# Patient Record
Sex: MALE | Race: WHITE | NOT HISPANIC OR LATINO | Employment: FULL TIME | URBAN - METROPOLITAN AREA
[De-identification: names, ages, dates, MRNs, and addresses within clinical notes are randomized per-mention and may not be internally consistent; named-entity substitution may affect disease eponyms.]

---

## 2017-01-10 ENCOUNTER — OFFICE VISIT (OUTPATIENT)
Dept: SURGERY | Facility: CLINIC | Age: 52
End: 2017-01-10

## 2017-01-10 ENCOUNTER — HOSPITAL ENCOUNTER (OUTPATIENT)
Dept: GENERAL RADIOLOGY | Facility: HOSPITAL | Age: 52
Discharge: HOME | End: 2017-01-10
Attending: THORACIC SURGERY (CARDIOTHORACIC VASCULAR SURGERY) | Admitting: THORACIC SURGERY (CARDIOTHORACIC VASCULAR SURGERY)

## 2017-01-10 VITALS
WEIGHT: 185 LBS | OXYGEN SATURATION: 96 % | RESPIRATION RATE: 16 BRPM | HEIGHT: 75 IN | HEART RATE: 88 BPM | BODY MASS INDEX: 23 KG/M2 | SYSTOLIC BLOOD PRESSURE: 120 MMHG | DIASTOLIC BLOOD PRESSURE: 74 MMHG

## 2017-01-10 DIAGNOSIS — S22.41XD CLOSED FRACTURE OF MULTIPLE RIBS OF RIGHT SIDE WITH ROUTINE HEALING, SUBSEQUENT ENCOUNTER: ICD-10-CM

## 2017-01-10 DIAGNOSIS — S22.41XD CLOSED FRACTURE OF MULTIPLE RIBS OF RIGHT SIDE WITH ROUTINE HEALING, SUBSEQUENT ENCOUNTER: Primary | ICD-10-CM

## 2017-01-10 DIAGNOSIS — Z09 FOLLOW-UP EXAMINATION FOLLOWING SURGERY: ICD-10-CM

## 2017-01-10 PROCEDURE — 71020 HC CHEST PA AND LATERAL: CPT

## 2017-01-10 PROCEDURE — 99024 POSTOP FOLLOW-UP VISIT: CPT | Performed by: THORACIC SURGERY (CARDIOTHORACIC VASCULAR SURGERY)

## 2017-01-10 NOTE — MR AVS SNAPSHOT
Blayne Grijalva   1/10/2017 1:45 PM   Office Visit    Dept Phone:  [phone]   Encounter #:  [id number]    Provider:  Vance Aguayo III, MD   Department:  [facility] THORACIC SURGERY                Your Full Care Plan              Your Updated Medication List          This list is accurate as of: 1/10/17  2:02 PM.  Always use your most recent med list.                azithromycin 250 MG tablet   Commonly known as:  ZITHROMAX Z-ALEJANDRO   Take 2 tablets the first day, then 1 tablet daily for 4 days.       fluticasone 50 MCG/ACT nasal spray   Commonly known as:  FLONASE       promethazine-dextromethorphan 6.25-15 MG/5ML syrup   Commonly known as:  PROMETHAZINE-DM   Take 5 mL by mouth 4 (Four) Times a Day As Needed for cough.       Pseudoephedrine-Guaifenesin -1200 MG tablet sustained-release 12 hour       * warfarin 10 MG tablet   Commonly known as:  COUMADIN       * warfarin 7.5 MG tablet   Commonly known as:  COUMADIN       * Notice:  This list has 2 medication(s) that are the same as other medications prescribed for you. Read the directions carefully, and ask your doctor or other care provider to review them with you.            You Were Diagnosed With        Codes Comments    Closed fracture of multiple ribs of right side with routine healing, subsequent encounter    -  Primary ICD-10-CM: S22.41XD  ICD-9-CM: V54.19     Follow-up examination following surgery     ICD-10-CM: Z09  ICD-9-CM: V67.00       Instructions     None    Patient Instructions History      Upcoming Appointments     Visit Type Date Time Department    OFFICE VISIT 1/10/2017  1:45 PM [facility] THORACIC SPEC SEUN    XR SEUN CHEST 2 VW 1/10/2017  2:45 PM [facility] SEUN XRAY      MyChart Signup     Our records indicate that you have an active [employer] account.    You can view your After Visit Summary by going to [employer] and logging in with your [employer] username and password.  If you  "don't have a [employer] username and password but a parent or guardian has access to your record, the parent or guardian should login with their own [employer] username and password and access your record to view the After Visit Summary.    If you have questions, you can email [email or web address] or call [phone] to talk to our [employer] staff.  Remember, [employer] is NOT to be used for urgent needs.  For medical emergencies, dial 911.               Other Info from Your Visit           Your Appointments     Anthony 10, 2017  2:45 PM EST   XR seun chest 2 vw with SEUN XR 8   [facility] XRAY ([place])    [address] [facility] [address] [phone]           Bring along any outside films relating to this procedure. Arrive 15 minutes before your scheduled time.              Allergies     No Known Allergies      Reason for Visit     Follow-up 2 MO F/U VISIT W/ CXR      Vital Signs     Blood Pressure Pulse Respirations Height Weight Oxygen Saturation    120/74 (BP Location: Left arm, Patient Position: Sitting, Cuff Size: Adult) 88 16 75\" (190.5 cm) 185 lb (83.9 kg) 96%    Body Mass Index Smoking Status                23.12 kg/m2 Never Smoker          Problems and Diagnoses Noted     Closed fracture of multiple ribs of right side with routine healing, subsequent encounter    -  Primary    Encounter for examination following surgery            "

## 2017-01-10 NOTE — PROGRESS NOTES
Subjective   Patient ID: Blayne Grijalva is a 51 y.o. male who presents to the office today for a 2 month follow up visit with chest xray.    History of Present Illness    Blayne Grijalva was seen in our office today anyway 10/20/17 for further follow-up of a right thoracotomy with evacuation of hemothorax and open reduction internal fixation of the broken ribs performed September 23, 2016.  Since his last visit here he has done very well.  He has resumed all of his normal activities.  He runs on regular basis, rides his bike, and works out at a gym.  With all these exercises he has had no chest wall discomfort.  He has had no shortness of breath.  He has had no cough or hemoptysis.  By his account he has returned to his normal state.    The following portions of the patient's history were reviewed and updated as appropriate: allergies, current medications, past family history, past medical history, past social history, past surgical history and problem list.  Review of Systems   Constitution: Negative.   HENT: Negative.    Eyes: Negative.    Cardiovascular: Negative.    Respiratory: Negative.    Endocrine: Negative.    Hematologic/Lymphatic: Negative.    Skin: Negative.    Musculoskeletal: Negative.    Gastrointestinal: Negative.    Genitourinary: Negative.    Neurological: Negative.    Psychiatric/Behavioral: Negative.      Patient Active Problem List   Diagnosis   • Hypercoagulable state   • Factor V Leiden mutation   • Anticoagulant long-term use   • Anemia associated with acute blood loss   • Closed fracture of multiple ribs of right side   • Hemothorax with pneumothorax, traumatic   • Lung laceration   • Atelectasis   • Factor V deficiency   • Low back pain   • Rash     Past Medical History   Diagnosis Date   • DVT (deep venous thrombosis)    • Factor V Leiden    • Factor V Leiden    • Hypercoagulable state      Past Surgical History   Procedure Laterality Date   • Thoracoscopy Right 9/23/2016     Procedure:  RIGHT VIDEO ASSISTED THORACOSCOPY  WITH OPEN REDUCTION AND INTERNAL FIXATION OF MULTIPLE RIB FRACTURES, PARTIAL DECORTICATION OF RIGHT LOWER LOBE.;  Surgeon: Brijesh Arguello MD;  Location: [facility] MAIN OR;  Service:    • Colonoscopy N/A 10/19/2016     Procedure: COLONOSCOPY TO CECUM WITH HOT SNARE POLYPECTOMY AND CLIP PLACEMENT X 1;  Surgeon: Gilson Foy MD;  Location: [facility] ENDOSCOPY;  Service:      Family History   Problem Relation Age of Onset   • Ovarian cancer Mother    • Bleeding Disorder Mother    • Bleeding Disorder Father    • Bleeding Disorder Sister    • No Known Problems Brother    • No Known Problems Daughter    • No Known Problems Son    • No Known Problems Maternal Aunt    • No Known Problems Maternal Uncle    • No Known Problems Paternal Aunt    • No Known Problems Paternal Uncle    • No Known Problems Maternal Grandmother    • No Known Problems Maternal Grandfather    • No Known Problems Paternal Grandmother    • No Known Problems Paternal Grandfather    • Bleeding Disorder Sister      Social History     Social History   • Marital status:      Spouse name: N/A   • Number of children: N/A   • Years of education: N/A     Occupational History   • Not on file.     Social History Main Topics   • Smoking status: Never Smoker   • Smokeless tobacco: Never Used   • Alcohol use 4.2 oz/week     7 Cans of beer per week   • Drug use: No   • Sexual activity: Defer     Other Topics Concern   • Not on file     Social History Narrative       Current Outpatient Prescriptions:   •  azithromycin (ZITHROMAX Z-ALEJANDRO) 250 MG tablet, Take 2 tablets the first day, then 1 tablet daily for 4 days., Disp: 6 tablet, Rfl: 0  •  fluticasone (FLONASE) 50 MCG/ACT nasal spray, INHALE ONE PUFF IN EACH NOSTRIL IN THE MORNING AND EVENING, Disp: , Rfl: 2  •  promethazine-dextromethorphan (PROMETHAZINE-DM) 6.25-15 MG/5ML syrup, Take 5 mL by mouth 4 (Four) Times a Day As Needed for cough., Disp: 90 mL, Rfl: 0  •   Pseudoephedrine-Guaifenesin -1200 MG tablet sustained-release 12 hour, Take  by mouth., Disp: , Rfl:   •  warfarin (COUMADIN) 10 MG tablet, Take 10 mg by mouth daily., Disp: , Rfl:   •  warfarin (COUMADIN) 7.5 MG tablet, Take 7.5 mg by mouth daily., Disp: , Rfl:   No Known Allergies     Objective   Vitals:    01/10/17 1351   BP: 120/74   Pulse: 88   Resp: 16   SpO2: 96%     Physical Exam   Constitutional: He is oriented to person, place, and time. Vital signs are normal. He appears well-developed.   HENT:   Head: Normocephalic and atraumatic.   Neck: Normal range of motion. Neck supple.   Cardiovascular: Normal rate, regular rhythm, normal heart sounds and intact distal pulses.    No murmur heard.  Pulmonary/Chest: Effort normal and breath sounds normal. He has no wheezes. He has no rhonchi. He has no rales. He exhibits no tenderness.   Abdominal: Soft. There is no tenderness.   Musculoskeletal: Normal range of motion. He exhibits no edema or tenderness.   Neurological: He is alert and oriented to person, place, and time. He has normal strength.   Skin: Skin is warm and dry. No rash noted. No cyanosis or erythema.   Psychiatric: He has a normal mood and affect. His behavior is normal.       Assessment/Plan   Independent Review of Radiographic Studies:    Chest x-ray performed today was independently reviewed and compared to previous x-rays.  There is some blunting of the right costophrenic angle which is unchanged.  Both lungs are fully expanded.  There is no infiltrates and no effusions.  The rib plates are in good position.    Discussion: Mr. Grijalva has made a good recovery from his injury and surgery.  He has resumed his normal activities.  I will release him from our care but will be glad to see him anytime in the future you feel it's necessary.  Thank you for allowing us to participate in the care of Mr. Grijalva.      Diagnoses and all orders for this visit:    Closed fracture of multiple ribs of right  side with routine healing, subsequent encounter    Follow-up examination following surgery

## 2017-01-10 NOTE — LETTER
January 10, 2017     Mariano Sanchez MD  [address]  [facility] [address]    Patient: Blayne Grijalva   YOB: 1965   Date of Visit: 1/10/2017       Dear Dr. Daniel MD:    Thank you for referring Blayne Grijalva to me for evaluation. Below are the relevant portions of my assessment and plan of care.    If you have questions, please do not hesitate to call me. I look forward to following Blayne along with you.         Sincerely,        Vance Aguayo III, MD        CC: MD Vance Gleason III, MD  1/10/2017  2:06 PM  Sign at close encounter  Subjective   Patient ID: Blayne Grijalva is a 51 y.o. male who presents to the office today for a 2 month follow up visit with chest xray.    History of Present Illness    Blayne Grijalva was seen in our office today anyway 10/20/17 for further follow-up of a right thoracotomy with evacuation of hemothorax and open reduction internal fixation of the broken ribs performed September 23, 2016.  Since his last visit here he has done very well.  He has resumed all of his normal activities.  He runs on regular basis, rides his bike, and works out at a gym.  With all these exercises he has had no chest wall discomfort.  He has had no shortness of breath.  He has had no cough or hemoptysis.  By his account he has returned to his normal state.    The following portions of the patient's history were reviewed and updated as appropriate: allergies, current medications, past family history, past medical history, past social history, past surgical history and problem list.  Review of Systems   Constitution: Negative.   HENT: Negative.    Eyes: Negative.    Cardiovascular: Negative.    Respiratory: Negative.    Endocrine: Negative.    Hematologic/Lymphatic: Negative.    Skin: Negative.    Musculoskeletal: Negative.    Gastrointestinal: Negative.    Genitourinary: Negative.    Neurological: Negative.    Psychiatric/Behavioral: Negative.      Patient  Active Problem List   Diagnosis   • Hypercoagulable state   • Factor V Leiden mutation   • Anticoagulant long-term use   • Anemia associated with acute blood loss   • Closed fracture of multiple ribs of right side   • Hemothorax with pneumothorax, traumatic   • Lung laceration   • Atelectasis   • Factor V deficiency   • Low back pain   • Rash     Past Medical History   Diagnosis Date   • DVT (deep venous thrombosis)    • Factor V Leiden    • Factor V Leiden    • Hypercoagulable state      Past Surgical History   Procedure Laterality Date   • Thoracoscopy Right 9/23/2016     Procedure: RIGHT VIDEO ASSISTED THORACOSCOPY  WITH OPEN REDUCTION AND INTERNAL FIXATION OF MULTIPLE RIB FRACTURES, PARTIAL DECORTICATION OF RIGHT LOWER LOBE.;  Surgeon: Brijesh Arguello MD;  Location: [facility] OR;  Service:    • Colonoscopy N/A 10/19/2016     Procedure: COLONOSCOPY TO CECUM WITH HOT SNARE POLYPECTOMY AND CLIP PLACEMENT X 1;  Surgeon: Gilson Foy MD;  Location: [facility] ENDOSCOPY;  Service:      Family History   Problem Relation Age of Onset   • Ovarian cancer Mother    • Bleeding Disorder Mother    • Bleeding Disorder Father    • Bleeding Disorder Sister    • No Known Problems Brother    • No Known Problems Daughter    • No Known Problems Son    • No Known Problems Maternal Aunt    • No Known Problems Maternal Uncle    • No Known Problems Paternal Aunt    • No Known Problems Paternal Uncle    • No Known Problems Maternal Grandmother    • No Known Problems Maternal Grandfather    • No Known Problems Paternal Grandmother    • No Known Problems Paternal Grandfather    • Bleeding Disorder Sister      Social History     Social History   • Marital status:      Spouse name: N/A   • Number of children: N/A   • Years of education: N/A     Occupational History   • Not on file.     Social History Main Topics   • Smoking status: Never Smoker   • Smokeless tobacco: Never Used   • Alcohol use 4.2 oz/week     7 Cans of beer per  week   • Drug use: No   • Sexual activity: Defer     Other Topics Concern   • Not on file     Social History Narrative       Current Outpatient Prescriptions:   •  azithromycin (ZITHROMAX Z-ALEJANDRO) 250 MG tablet, Take 2 tablets the first day, then 1 tablet daily for 4 days., Disp: 6 tablet, Rfl: 0  •  fluticasone (FLONASE) 50 MCG/ACT nasal spray, INHALE ONE PUFF IN EACH NOSTRIL IN THE MORNING AND EVENING, Disp: , Rfl: 2  •  promethazine-dextromethorphan (PROMETHAZINE-DM) 6.25-15 MG/5ML syrup, Take 5 mL by mouth 4 (Four) Times a Day As Needed for cough., Disp: 90 mL, Rfl: 0  •  Pseudoephedrine-Guaifenesin -1200 MG tablet sustained-release 12 hour, Take  by mouth., Disp: , Rfl:   •  warfarin (COUMADIN) 10 MG tablet, Take 10 mg by mouth daily., Disp: , Rfl:   •  warfarin (COUMADIN) 7.5 MG tablet, Take 7.5 mg by mouth daily., Disp: , Rfl:   No Known Allergies     Objective   Vitals:    01/10/17 1351   BP: 120/74   Pulse: 88   Resp: 16   SpO2: 96%     Physical Exam   Constitutional: He is oriented to person, place, and time. Vital signs are normal. He appears well-developed.   HENT:   Head: Normocephalic and atraumatic.   Neck: Normal range of motion. Neck supple.   Cardiovascular: Normal rate, regular rhythm, normal heart sounds and intact distal pulses.    No murmur heard.  Pulmonary/Chest: Effort normal and breath sounds normal. He has no wheezes. He has no rhonchi. He has no rales. He exhibits no tenderness.   Abdominal: Soft. There is no tenderness.   Musculoskeletal: Normal range of motion. He exhibits no edema or tenderness.   Neurological: He is alert and oriented to person, place, and time. He has normal strength.   Skin: Skin is warm and dry. No rash noted. No cyanosis or erythema.   Psychiatric: He has a normal mood and affect. His behavior is normal.       Assessment/Plan   Independent Review of Radiographic Studies:    Chest x-ray performed today was independently reviewed and compared to previous x-rays.   There is some blunting of the right costophrenic angle which is unchanged.  Both lungs are fully expanded.  There is no infiltrates and no effusions.  The rib plates are in good position.    Discussion: Mr. Grijalva has made a good recovery from his injury and surgery.  He has resumed his normal activities.  I will release him from our care but will be glad to see him anytime in the future you feel it's necessary.  Thank you for allowing us to participate in the care of Mr. Grijalva.      Diagnoses and all orders for this visit:    Closed fracture of multiple ribs of right side with routine healing, subsequent encounter    Follow-up examination following surgery

## 2017-01-12 ENCOUNTER — TELEPHONE (OUTPATIENT)
Dept: ONCOLOGY | Facility: HOSPITAL | Age: 52
End: 2017-01-12

## 2017-01-12 LAB
EXTERNAL INR: 2.1
EXTERNAL RESULTING LABORATORY: NORMAL

## 2017-01-12 NOTE — TELEPHONE ENCOUNTER
RECEIVED INR FROM [facility]. INR 2.1 TODAY. PT'S RANGE IS 2.5-3.5. PT STATES HE JUST GOT DONE TAKING A Z-ALEJANDRO A FEW DAYS AGO. HE DENIES ANY MISSED DOSES. PLACED IN SS AND REVIEWED WITH TREY CERVANTES NP. PER TREY, HAVE PT TAKE 20MG SUN, TUES, THURS AND 17.5MG ALL OTHER DAYS. HE SHOULD RECHECK NEXT WEEK. INFORMED PT OF THIS AND HE V/U.

## 2017-01-13 LAB
EXTERNAL INR: 2.1
EXTERNAL RESULTING LABORATORY: NORMAL

## 2017-01-17 ENCOUNTER — OFFICE VISIT (OUTPATIENT)
Dept: FAMILY MEDICINE CLINIC | Facility: CLINIC | Age: 52
End: 2017-01-17

## 2017-01-17 VITALS
DIASTOLIC BLOOD PRESSURE: 68 MMHG | RESPIRATION RATE: 18 BRPM | BODY MASS INDEX: 24.21 KG/M2 | HEART RATE: 68 BPM | OXYGEN SATURATION: 98 % | HEIGHT: 75 IN | SYSTOLIC BLOOD PRESSURE: 110 MMHG | WEIGHT: 194.7 LBS

## 2017-01-17 DIAGNOSIS — J41.0 SIMPLE CHRONIC BRONCHITIS (HCC): Primary | ICD-10-CM

## 2017-01-17 PROCEDURE — 99213 OFFICE O/P EST LOW 20 MIN: CPT | Performed by: NURSE PRACTITIONER

## 2017-01-17 RX ORDER — GUAIFENESIN AND PSEUDOEPHEDRINE HCL 1200; 120 MG/1; MG/1
TABLET, EXTENDED RELEASE ORAL
COMMUNITY
End: 2018-04-05

## 2017-01-17 RX ORDER — DEXTROMETHORPHAN HYDROBROMIDE AND PROMETHAZINE HYDROCHLORIDE 15; 6.25 MG/5ML; MG/5ML
5 SYRUP ORAL 4 TIMES DAILY PRN
Qty: 90 ML | Refills: 0 | Status: SHIPPED | OUTPATIENT
Start: 2017-01-17 | End: 2018-04-05

## 2017-01-17 RX ORDER — AMOXICILLIN AND CLAVULANATE POTASSIUM 875; 125 MG/1; MG/1
1 TABLET, FILM COATED ORAL 2 TIMES DAILY
Qty: 20 TABLET | Refills: 0 | Status: SHIPPED | OUTPATIENT
Start: 2017-01-17 | End: 2018-04-05

## 2017-01-17 RX ORDER — WARFARIN SODIUM 5 MG/1
17.5 TABLET ORAL
COMMUNITY
End: 2017-04-25 | Stop reason: SDUPTHER

## 2017-01-17 NOTE — MR AVS SNAPSHOT
Blayne Grijalva   1/17/2017 2:00 PM   Office Visit    Provider:  JENI Adorno   Department:  [facility] PRIMARY CARE   Dept Phone:  [phone]                Your Full Care Plan              Today's Medication Changes          These changes are accurate as of: 1/17/17  3:47 PM.  If you have any questions, ask your nurse or doctor.               New Medication(s)Ordered:     amoxicillin-clavulanate 875-125 MG per tablet   Commonly known as:  AUGMENTIN   Take 1 tablet by mouth 2 (Two) Times a Day.   Started by:  JENI Adorno         Medication(s)that have changed:     Pseudoephedrine-Guaifenesin -1200 MG tablet sustained-release 12 hour   Take  by mouth.   What changed:  Another medication with the same name was removed. Continue taking this medication, and follow the directions you see here.   Changed by:  JENI Adorno       warfarin 5 MG tablet   Commonly known as:  COUMADIN   Take 17.5 mg by mouth Daily.   What changed:  Another medication with the same name was removed. Continue taking this medication, and follow the directions you see here.   Changed by:  JENI Adorno         Stop taking medication(s)listed here:     azithromycin 250 MG tablet   Commonly known as:  ZITHROMAX Z-ALEJANDRO   Stopped by:  JENI Adorno                Where to Get Your Medications      These medications were sent to [facility]/pharmacy #[id number] - [place], [place] - [address] IN [facility] [phone] [facility] [phone]   [address], [place] [address]    Hours:  24-hours Phone:  [phone]     amoxicillin-clavulanate 875-125 MG per tablet    promethazine-dextromethorphan 6.25-15 MG/5ML syrup                  Your Updated Medication List          This list is accurate as of: 1/17/17  3:47 PM.  Always use your most recent med list.                amoxicillin-clavulanate 875-125 MG per tablet   Commonly known as:  AUGMENTIN   Take 1  "tablet by mouth 2 (Two) Times a Day.       fluticasone 50 MCG/ACT nasal spray   Commonly known as:  FLONASE       promethazine-dextromethorphan 6.25-15 MG/5ML syrup   Commonly known as:  PROMETHAZINE-DM   Take 5 mL by mouth 4 (Four) Times a Day As Needed for cough.       Pseudoephedrine-Guaifenesin -1200 MG tablet sustained-release 12 hour       warfarin 5 MG tablet   Commonly known as:  COUMADIN               Instructions     None    Patient Instructions History      MyChart Signup     Our records indicate that you have an active itravel account.    You can view your After Visit Summary by going to [employer] and logging in with your [employer] username and password.  If you don't have a [employer] username and password but a parent or guardian has access to your record, the parent or guardian should login with their own Oru[employer] username and password and access your record to view the After Visit Summary.    If you have questions, you can email [email or web address] or call [phone] to talk to our [employer] staff.  Remember, [employer] is NOT to be used for urgent needs.  For medical emergencies, dial 911.               Other Info from Your Visit           Allergies     No Known Allergies      Reason for Visit     Nasal Congestion States that symptoms have been going on since the 1st of Dec.     Cough productive sputum yellowish in color      Vital Signs     Blood Pressure Pulse Respirations Height Weight Oxygen Saturation    110/68 68 18 75\" (190.5 cm) 194 lb 11.2 oz (88.3 kg) 98%    Body Mass Index Smoking Status                24.34 kg/m2 Never Smoker          "

## 2017-01-17 NOTE — PROGRESS NOTES
"Subjective   Blayne Grijalva is a 51 y.o. male. Treated 12/27 for bronchitis, still has productive cough.Took the z-king with no improvement.  He reports that he went to see Dr. Aguayo for follow up with his rib fractures and had a CXR which looked fine.    Chief Complaint   Patient presents with   • Nasal Congestion     States that symptoms have been going on since the 1st of Dec.    • Cough     productive sputum yellowish in color     Social History   Substance Use Topics   • Smoking status: Never Smoker   • Smokeless tobacco: Never Used   • Alcohol use 4.2 oz/week     7 Cans of beer per week       History of Present Illness     The following portions of the patient's history were reviewed and updated as appropriate: allergies, current medications, past social history and problem list.  Review of Systems   Constitutional: Negative for activity change, appetite change and fever.   HENT: Positive for congestion, sinus pressure and sore throat.    Respiratory: Positive for cough. Negative for shortness of breath and wheezing.    All other systems reviewed and are negative.      Objective   Vitals:    01/17/17 1423   BP: 110/68   Pulse: 68   Resp: 18   SpO2: 98%   Weight: 194 lb 11.2 oz (88.3 kg)   Height: 75\" (190.5 cm)     Body mass index is 24.34 kg/(m^2).    Physical Exam   Constitutional: He appears well-developed and well-nourished. No distress.   HENT:   Head: Normocephalic.   Right Ear: External ear normal.   Left Ear: External ear normal.   Op red with drainage   Eyes: EOM are normal.   Neck: Neck supple. No thyromegaly present.   Cardiovascular: Normal rate and regular rhythm.    Pulmonary/Chest: Effort normal and breath sounds normal. No respiratory distress.   Abdominal: Soft.   Musculoskeletal: Normal range of motion.   Lymphadenopathy:     He has no cervical adenopathy.   Neurological: He is alert.   Skin: Skin is warm.   Psychiatric: He has a normal mood and affect.   Nursing note and vitals " reviewed.      Assessment/Plan   Problem List Items Addressed This Visit     None         Bronchitis simple  Augmentin 875 mgs bid for 12 days  Return for worsening symptoms or no improvement.

## 2017-01-18 PROBLEM — J41.0 SIMPLE CHRONIC BRONCHITIS: Status: ACTIVE | Noted: 2017-01-18

## 2017-02-21 ENCOUNTER — TELEPHONE (OUTPATIENT)
Dept: ONCOLOGY | Facility: HOSPITAL | Age: 52
End: 2017-02-21

## 2017-02-21 LAB
EXTERNAL INR: 2.6
EXTERNAL RESULTING LABORATORY: NORMAL

## 2017-02-21 NOTE — TELEPHONE ENCOUNTER
Received INR from [facility]. INR 2.6 today. Pt denies any missed doses or new medication. Has been taking coumadin per  protocol. Placed in SS. SS gave high dose increase so discussed with Radha Garcia NP and she recommended pt taking 17.5mg Sun, Thurs 20mg all other days. Informed pt and he v/u.

## 2017-04-10 ENCOUNTER — TELEPHONE (OUTPATIENT)
Dept: ONCOLOGY | Facility: HOSPITAL | Age: 52
End: 2017-04-10

## 2017-04-10 LAB
EXTERNAL INR: 3
EXTERNAL RESULTING LABORATORY: NORMAL

## 2017-04-10 NOTE — TELEPHONE ENCOUNTER
[id number] PATIENT RETURNS CALL TO THE OFFICE.  CONFIRMS DOSING THAT HE TOOK OVER THE PREVIOUS 7 DAYS.  PATIENT WILL CONTINUE SAME DOSING AS HE IS THERAPEUTIC AT 3.0

## 2017-04-10 NOTE — TELEPHONE ENCOUNTER
ATTEMPTED TO REACH PATIENT TO DISCUSS INR (3.0 AND PATIENT SHOULD CONTINUE SAME DOSE WITH RANGE OF 2.5-3.5), NO ANSWER.  MESSAGE LEFT FOR PATIENT TO RETURN CALL.

## 2017-04-25 RX ORDER — WARFARIN SODIUM 5 MG/1
TABLET ORAL
Qty: 360 TABLET | Refills: 1 | Status: SHIPPED | OUTPATIENT
Start: 2017-04-25 | End: 2017-09-30 | Stop reason: SDUPTHER

## 2017-05-11 ENCOUNTER — TELEPHONE (OUTPATIENT)
Dept: ONCOLOGY | Facility: CLINIC | Age: 52
End: 2017-05-11

## 2017-05-15 ENCOUNTER — TELEPHONE (OUTPATIENT)
Dept: ONCOLOGY | Facility: CLINIC | Age: 52
End: 2017-05-15

## 2017-05-15 LAB
EXTERNAL INR: 3.9
EXTERNAL RESULTING LABORATORY: NORMAL

## 2017-06-30 ENCOUNTER — TELEPHONE (OUTPATIENT)
Dept: ONCOLOGY | Facility: CLINIC | Age: 52
End: 2017-06-30

## 2017-06-30 LAB
EXTERNAL INR: 3.2 (ref 2.5–3.5)
EXTERNAL RESULTING LABORATORY: NORMAL

## 2017-06-30 NOTE — TELEPHONE ENCOUNTER
SPOKE TO PATIENT RE INR 3.2.  PATIENT DENIES ANY MISSED DOSES, NO NEW MEDS, NO BLEEDING/BRUISING.   DOSE ADJUSTED PER  PROTOCOL, 15MG ON SUN, 17.5 MG ALL OTHER DAYS.   PATIENT V/U

## 2017-08-07 ENCOUNTER — DOCUMENTATION (OUTPATIENT)
Dept: ONCOLOGY | Facility: HOSPITAL | Age: 52
End: 2017-08-07

## 2017-08-07 LAB
EXTERNAL INR: 3.4
EXTERNAL RESULTING LABORATORY: NORMAL

## 2017-08-07 NOTE — PROGRESS NOTES
Received INR fax from [facility] this morning.  Called pt and LM for him to return call regarding coumadin dosing.  Awaiting call back.

## 2017-08-07 NOTE — PROGRESS NOTES
INR from [facility] today 3.4.  Range for pt is 2.5-3.5.  Placed in  and called pt to confirm dosing.  New dosing given and he v/u.  Denies complaints.  Pt requesting to be called on his cell [phone], and updated phone number in [facility] (they had his home # listed).

## 2017-09-19 ENCOUNTER — TELEPHONE (OUTPATIENT)
Dept: ONCOLOGY | Facility: CLINIC | Age: 52
End: 2017-09-19

## 2017-09-19 LAB
EXTERNAL INR: 2.1
EXTERNAL RESULTING LABORATORY: NORMAL

## 2017-09-19 NOTE — TELEPHONE ENCOUNTER
Received INR from [facility]. INR 2.1 today. Pt's goal 2.5-3.5. Pt states he had a vasectomy on 8/31. He was taken off coumadin and bridged per the surgeon. He restarted coumadin on 9/2. He went back on previous dosing per SS. Placed in SS. Pt will now take 17.5 M,W,F and 20mg all other days. Asked pt to recheck next week. He v/u.

## 2017-10-02 RX ORDER — WARFARIN SODIUM 5 MG/1
TABLET ORAL
Qty: 360 TABLET | Refills: 1 | Status: SHIPPED | OUTPATIENT
Start: 2017-10-02 | End: 2018-03-12 | Stop reason: SDUPTHER

## 2017-11-13 ENCOUNTER — DOCUMENTATION (OUTPATIENT)
Dept: ONCOLOGY | Facility: HOSPITAL | Age: 52
End: 2017-11-13

## 2017-11-13 ENCOUNTER — TELEPHONE (OUTPATIENT)
Dept: ONCOLOGY | Facility: HOSPITAL | Age: 52
End: 2017-11-13

## 2017-11-13 LAB
EXTERNAL INR: 3.6
EXTERNAL RESULTING LABORATORY: NORMAL

## 2017-11-13 NOTE — PROGRESS NOTES
Received INR fax from [facility].  INR today 3.6.  Range for this pt is 2.5-3.5.  Called ptat 1330 to verify current coumadin dosing and LM for him to return call.

## 2018-01-11 ENCOUNTER — TELEPHONE (OUTPATIENT)
Dept: ONCOLOGY | Facility: HOSPITAL | Age: 53
End: 2018-01-11

## 2018-01-11 NOTE — TELEPHONE ENCOUNTER
INR 3.3. CALLED PT, NO ANSWER. LEFT INFO WITH NEW DOSING ON V/M AND ASKED HIM TO CALL BACK TO CONFIRM AND ALSO TO GET ON SCHEDULE FOR YEARLY MD FOLLOW UP. HASNT BEEN SEEN IN OVER A YEAR.

## 2018-01-11 NOTE — TELEPHONE ENCOUNTER
Pt called back to confirm INR and coumadin. Left v/m for triage. I called pt back, no answer again. Left v/m

## 2018-03-12 RX ORDER — WARFARIN SODIUM 5 MG/1
17.5 TABLET ORAL
Qty: 360 TABLET | Refills: 0 | Status: SHIPPED | OUTPATIENT
Start: 2018-03-12 | End: 2018-04-05

## 2018-04-05 ENCOUNTER — OFFICE VISIT (OUTPATIENT)
Dept: ONCOLOGY | Facility: CLINIC | Age: 53
End: 2018-04-05

## 2018-04-05 ENCOUNTER — LAB (OUTPATIENT)
Dept: LAB | Facility: HOSPITAL | Age: 53
End: 2018-04-05

## 2018-04-05 VITALS
WEIGHT: 202.6 LBS | HEIGHT: 75 IN | SYSTOLIC BLOOD PRESSURE: 102 MMHG | HEART RATE: 56 BPM | OXYGEN SATURATION: 96 % | DIASTOLIC BLOOD PRESSURE: 68 MMHG | RESPIRATION RATE: 16 BRPM | BODY MASS INDEX: 25.19 KG/M2 | TEMPERATURE: 98.9 F

## 2018-04-05 DIAGNOSIS — Z79.01 ANTICOAGULANT LONG-TERM USE: Primary | ICD-10-CM

## 2018-04-05 DIAGNOSIS — S22.41XD CLOSED FRACTURE OF MULTIPLE RIBS OF RIGHT SIDE WITH ROUTINE HEALING, SUBSEQUENT ENCOUNTER: ICD-10-CM

## 2018-04-05 DIAGNOSIS — D68.51 FACTOR V LEIDEN MUTATION (HCC): ICD-10-CM

## 2018-04-05 DIAGNOSIS — S27.2XXD TRAUMATIC PNEUMOHEMOTHORAX, SUBSEQUENT ENCOUNTER: ICD-10-CM

## 2018-04-05 DIAGNOSIS — D62 ANEMIA ASSOCIATED WITH ACUTE BLOOD LOSS: Primary | ICD-10-CM

## 2018-04-05 LAB
BASOPHILS # BLD AUTO: 0.06 10*3/MM3 (ref 0–0.1)
BASOPHILS NFR BLD AUTO: 0.9 % (ref 0–1.1)
DEPRECATED RDW RBC AUTO: 43.3 FL (ref 37–49)
EOSINOPHIL # BLD AUTO: 0.24 10*3/MM3 (ref 0–0.36)
EOSINOPHIL NFR BLD AUTO: 3.5 % (ref 1–5)
ERYTHROCYTE [DISTWIDTH] IN BLOOD BY AUTOMATED COUNT: 12.6 % (ref 11.7–14.5)
HCT VFR BLD AUTO: 42.6 % (ref 40–49)
HGB BLD-MCNC: 14.5 G/DL (ref 13.5–16.5)
IMM GRANULOCYTES # BLD: 0.03 10*3/MM3 (ref 0–0.03)
IMM GRANULOCYTES NFR BLD: 0.4 % (ref 0–0.5)
LYMPHOCYTES # BLD AUTO: 2.26 10*3/MM3 (ref 1–3.5)
LYMPHOCYTES NFR BLD AUTO: 32.8 % (ref 20–49)
MCH RBC QN AUTO: 31.9 PG (ref 27–33)
MCHC RBC AUTO-ENTMCNC: 34 G/DL (ref 32–35)
MCV RBC AUTO: 93.8 FL (ref 83–97)
MONOCYTES # BLD AUTO: 0.69 10*3/MM3 (ref 0.25–0.8)
MONOCYTES NFR BLD AUTO: 10 % (ref 4–12)
NEUTROPHILS # BLD AUTO: 3.6 10*3/MM3 (ref 1.5–7)
NEUTROPHILS NFR BLD AUTO: 52.4 % (ref 39–75)
NRBC BLD MANUAL-RTO: 0 /100 WBC (ref 0–0)
PLATELET # BLD AUTO: 200 10*3/MM3 (ref 150–375)
PMV BLD AUTO: 9.8 FL (ref 8.9–12.1)
RBC # BLD AUTO: 4.54 10*6/MM3 (ref 4.3–5.5)
WBC NRBC COR # BLD: 6.88 10*3/MM3 (ref 4–10)

## 2018-04-05 PROCEDURE — 85025 COMPLETE CBC W/AUTO DIFF WBC: CPT | Performed by: INTERNAL MEDICINE

## 2018-04-05 PROCEDURE — 36415 COLL VENOUS BLD VENIPUNCTURE: CPT | Performed by: INTERNAL MEDICINE

## 2018-04-05 PROCEDURE — 99214 OFFICE O/P EST MOD 30 MIN: CPT | Performed by: INTERNAL MEDICINE

## 2018-04-05 RX ORDER — CHLORAL HYDRATE 500 MG
CAPSULE ORAL
COMMUNITY
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
## 2019-04-22 ENCOUNTER — TELEPHONE (OUTPATIENT)
Dept: GENERAL RADIOLOGY | Facility: HOSPITAL | Age: 54
End: 2019-04-22

## 2019-04-22 DIAGNOSIS — Z79.01 ANTICOAGULANT LONG-TERM USE: ICD-10-CM

## 2019-04-22 DIAGNOSIS — D68.51 FACTOR V LEIDEN MUTATION (HCC): ICD-10-CM

## 2019-04-22 DIAGNOSIS — S22.41XD CLOSED FRACTURE OF MULTIPLE RIBS OF RIGHT SIDE WITH ROUTINE HEALING, SUBSEQUENT ENCOUNTER: ICD-10-CM

## 2019-04-22 DIAGNOSIS — S27.2XXD TRAUMATIC PNEUMOHEMOTHORAX, SUBSEQUENT ENCOUNTER: ICD-10-CM

## 2019-04-22 RX ORDER — APIXABAN 5 MG/1
TABLET, FILM COATED ORAL
Qty: 180 TABLET | Refills: 0 | Status: SHIPPED | OUTPATIENT
Start: 2019-04-22 | End: 2019-06-24 | Stop reason: SDUPTHER

## 2019-04-22 NOTE — TELEPHONE ENCOUNTER
----- Message from Ximena Moura sent at 4/22/2019  4:23 PM EDT -----  Please call pt for his yearly appt with Dr Shin. Thanks Ximena

## 2019-06-10 ENCOUNTER — APPOINTMENT (OUTPATIENT)
Dept: LAB | Facility: HOSPITAL | Age: 54
End: 2019-06-10

## 2019-06-10 ENCOUNTER — APPOINTMENT (OUTPATIENT)
Dept: ONCOLOGY | Facility: CLINIC | Age: 54
End: 2019-06-10

## 2019-06-24 ENCOUNTER — APPOINTMENT (OUTPATIENT)
Dept: LAB | Facility: HOSPITAL | Age: 54
End: 2019-06-24

## 2019-06-24 ENCOUNTER — APPOINTMENT (OUTPATIENT)
Dept: ONCOLOGY | Facility: CLINIC | Age: 54
End: 2019-06-24

## 2019-06-24 DIAGNOSIS — S27.2XXD TRAUMATIC PNEUMOHEMOTHORAX, SUBSEQUENT ENCOUNTER: ICD-10-CM

## 2019-06-24 DIAGNOSIS — D68.51 FACTOR V LEIDEN MUTATION (HCC): ICD-10-CM

## 2019-06-24 DIAGNOSIS — S22.41XD CLOSED FRACTURE OF MULTIPLE RIBS OF RIGHT SIDE WITH ROUTINE HEALING, SUBSEQUENT ENCOUNTER: ICD-10-CM

## 2019-06-24 DIAGNOSIS — Z79.01 ANTICOAGULANT LONG-TERM USE: ICD-10-CM

## 2019-06-24 RX ORDER — APIXABAN 5 MG/1
TABLET, FILM COATED ORAL
Qty: 180 TABLET | Refills: 0 | Status: SHIPPED | OUTPATIENT
Start: 2019-06-24 | End: 2019-09-03 | Stop reason: SDUPTHER

## 2019-07-25 ENCOUNTER — OFFICE VISIT (OUTPATIENT)
Dept: ONCOLOGY | Facility: CLINIC | Age: 54
End: 2019-07-25

## 2019-07-25 ENCOUNTER — APPOINTMENT (OUTPATIENT)
Dept: LAB | Facility: HOSPITAL | Age: 54
End: 2019-07-25

## 2019-07-25 VITALS
DIASTOLIC BLOOD PRESSURE: 76 MMHG | OXYGEN SATURATION: 98 % | HEIGHT: 75 IN | SYSTOLIC BLOOD PRESSURE: 112 MMHG | BODY MASS INDEX: 24.41 KG/M2 | TEMPERATURE: 98 F | WEIGHT: 196.3 LBS | RESPIRATION RATE: 18 BRPM | HEART RATE: 55 BPM

## 2019-07-25 DIAGNOSIS — Z79.01 ANTICOAGULANT LONG-TERM USE: ICD-10-CM

## 2019-07-25 DIAGNOSIS — D62 ANEMIA ASSOCIATED WITH ACUTE BLOOD LOSS: Primary | ICD-10-CM

## 2019-07-25 DIAGNOSIS — D68.59 HYPERCOAGULABLE STATE (HCC): Primary | ICD-10-CM

## 2019-07-25 LAB
ALBUMIN SERPL-MCNC: 4.2 G/DL (ref 3.5–5.2)
ALBUMIN/GLOB SERPL: 1.4 G/DL (ref 1.1–2.4)
ALP SERPL-CCNC: 54 U/L (ref 38–116)
ALT SERPL W P-5'-P-CCNC: 14 U/L (ref 0–41)
ANION GAP SERPL CALCULATED.3IONS-SCNC: 8.6 MMOL/L (ref 5–15)
AST SERPL-CCNC: 23 U/L (ref 0–40)
BASOPHILS # BLD AUTO: 0.04 10*3/MM3 (ref 0–0.2)
BASOPHILS NFR BLD AUTO: 0.6 % (ref 0–1.5)
BILIRUB SERPL-MCNC: 0.4 MG/DL (ref 0.2–1.2)
BUN BLD-MCNC: 19 MG/DL (ref 6–20)
BUN/CREAT SERPL: 24.4 (ref 7.3–30)
CALCIUM SPEC-SCNC: 9.2 MG/DL (ref 8.5–10.2)
CHLORIDE SERPL-SCNC: 103 MMOL/L (ref 98–107)
CO2 SERPL-SCNC: 27.4 MMOL/L (ref 22–29)
CREAT BLD-MCNC: 0.78 MG/DL (ref 0.7–1.3)
DEPRECATED RDW RBC AUTO: 44.1 FL (ref 37–54)
EOSINOPHIL # BLD AUTO: 0.13 10*3/MM3 (ref 0–0.4)
EOSINOPHIL NFR BLD AUTO: 2 % (ref 0.3–6.2)
ERYTHROCYTE [DISTWIDTH] IN BLOOD BY AUTOMATED COUNT: 11.9 % (ref 12.3–15.4)
GFR SERPL CREATININE-BSD FRML MDRD: 104 ML/MIN/1.73
GLOBULIN UR ELPH-MCNC: 2.9 GM/DL (ref 1.8–3.5)
GLUCOSE BLD-MCNC: 91 MG/DL (ref 74–124)
HCT VFR BLD AUTO: 41.1 % (ref 37.5–51)
HGB BLD-MCNC: 13.4 G/DL (ref 13–17.7)
IMM GRANULOCYTES # BLD AUTO: 0.01 10*3/MM3 (ref 0–0.05)
IMM GRANULOCYTES NFR BLD AUTO: 0.2 % (ref 0–0.5)
LYMPHOCYTES # BLD AUTO: 1.98 10*3/MM3 (ref 0.7–3.1)
LYMPHOCYTES NFR BLD AUTO: 30.7 % (ref 19.6–45.3)
MCH RBC QN AUTO: 32.8 PG (ref 26.6–33)
MCHC RBC AUTO-ENTMCNC: 32.6 G/DL (ref 31.5–35.7)
MCV RBC AUTO: 100.5 FL (ref 79–97)
MONOCYTES # BLD AUTO: 0.68 10*3/MM3 (ref 0.1–0.9)
MONOCYTES NFR BLD AUTO: 10.5 % (ref 5–12)
NEUTROPHILS # BLD AUTO: 3.62 10*3/MM3 (ref 1.7–7)
NEUTROPHILS NFR BLD AUTO: 56 % (ref 42.7–76)
NRBC BLD AUTO-RTO: 0 /100 WBC (ref 0–0.2)
PLATELET # BLD AUTO: 181 10*3/MM3 (ref 140–450)
PMV BLD AUTO: 9.6 FL (ref 6–12)
POTASSIUM BLD-SCNC: 4.4 MMOL/L (ref 3.5–4.7)
PROT SERPL-MCNC: 7.1 G/DL (ref 6.3–8)
RBC # BLD AUTO: 4.09 10*6/MM3 (ref 4.14–5.8)
SODIUM BLD-SCNC: 139 MMOL/L (ref 134–145)
WBC NRBC COR # BLD: 6.46 10*3/MM3 (ref 3.4–10.8)

## 2019-07-25 PROCEDURE — 80053 COMPREHEN METABOLIC PANEL: CPT | Performed by: INTERNAL MEDICINE

## 2019-07-25 PROCEDURE — 99213 OFFICE O/P EST LOW 20 MIN: CPT | Performed by: INTERNAL MEDICINE

## 2019-07-25 PROCEDURE — 36415 COLL VENOUS BLD VENIPUNCTURE: CPT | Performed by: INTERNAL MEDICINE

## 2019-07-25 PROCEDURE — 85025 COMPLETE CBC W/AUTO DIFF WBC: CPT | Performed by: INTERNAL MEDICINE
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
## 2019-09-03 DIAGNOSIS — D68.51 FACTOR V LEIDEN MUTATION (HCC): ICD-10-CM

## 2019-09-03 DIAGNOSIS — S22.41XD CLOSED FRACTURE OF MULTIPLE RIBS OF RIGHT SIDE WITH ROUTINE HEALING, SUBSEQUENT ENCOUNTER: ICD-10-CM

## 2019-09-03 DIAGNOSIS — Z79.01 ANTICOAGULANT LONG-TERM USE: ICD-10-CM

## 2019-09-03 DIAGNOSIS — S27.2XXD TRAUMATIC PNEUMOHEMOTHORAX, SUBSEQUENT ENCOUNTER: ICD-10-CM

## 2019-09-03 RX ORDER — APIXABAN 5 MG/1
TABLET, FILM COATED ORAL
Qty: 180 TABLET | Refills: 2 | Status: SHIPPED | OUTPATIENT
Start: 2019-09-03 | End: 2020-04-22

## 2020-04-22 DIAGNOSIS — Z79.01 ANTICOAGULANT LONG-TERM USE: ICD-10-CM

## 2020-04-22 DIAGNOSIS — S27.2XXD TRAUMATIC PNEUMOHEMOTHORAX, SUBSEQUENT ENCOUNTER: ICD-10-CM

## 2020-04-22 DIAGNOSIS — S22.41XD CLOSED FRACTURE OF MULTIPLE RIBS OF RIGHT SIDE WITH ROUTINE HEALING, SUBSEQUENT ENCOUNTER: ICD-10-CM

## 2020-04-22 DIAGNOSIS — D68.51 FACTOR V LEIDEN MUTATION (HCC): ICD-10-CM

## 2020-04-22 RX ORDER — APIXABAN 5 MG/1
TABLET, FILM COATED ORAL
Qty: 180 TABLET | Refills: 2 | Status: SHIPPED | OUTPATIENT
Start: 2020-04-22 | End: 2020-12-02

## 2020-09-28 ENCOUNTER — TELEPHONE (OUTPATIENT)
Dept: FAMILY MEDICINE CLINIC | Facility: CLINIC | Age: 55
End: 2020-09-28

## 2020-10-20 ENCOUNTER — OFFICE VISIT (OUTPATIENT)
Dept: FAMILY MEDICINE CLINIC | Facility: CLINIC | Age: 55
End: 2020-10-20

## 2020-10-20 VITALS
SYSTOLIC BLOOD PRESSURE: 110 MMHG | HEIGHT: 75 IN | RESPIRATION RATE: 16 BRPM | HEART RATE: 50 BPM | DIASTOLIC BLOOD PRESSURE: 64 MMHG | WEIGHT: 188 LBS | TEMPERATURE: 97 F | OXYGEN SATURATION: 99 % | BODY MASS INDEX: 23.38 KG/M2

## 2020-10-20 DIAGNOSIS — Z13.1 SCREENING FOR DIABETES MELLITUS: ICD-10-CM

## 2020-10-20 DIAGNOSIS — Z00.00 ROUTINE HEALTH MAINTENANCE: Primary | ICD-10-CM

## 2020-10-20 DIAGNOSIS — Z13.220 ENCOUNTER FOR LIPID SCREENING FOR CARDIOVASCULAR DISEASE: ICD-10-CM

## 2020-10-20 DIAGNOSIS — Z13.6 ENCOUNTER FOR LIPID SCREENING FOR CARDIOVASCULAR DISEASE: ICD-10-CM

## 2020-10-20 DIAGNOSIS — Z11.59 ENCOUNTER FOR HEPATITIS C SCREENING TEST FOR LOW RISK PATIENT: ICD-10-CM

## 2020-10-20 PROCEDURE — 99386 PREV VISIT NEW AGE 40-64: CPT | Performed by: FAMILY MEDICINE

## 2020-10-20 NOTE — PROGRESS NOTES
Blayne Grijalva is 54 y.o. and presents today for:     Chief Complaint   Patient presents with   • Annual Exam       HPI     Here to establish care and for general health maintenance. Overall quite healthy, no specific questions or concerns today. Due for some routine labs. Has a history of Factor V Leiden mutation. Has been on anticoagulation for many years, recently shifted from warfain to apixaban. Taking at prescribed and tolerating well.  Enjoys not having to check his INR regularly.    Has not had routine blood work done recently, would like to go ahead with it today.  Otherwise caught up on all preventive maintenance recommendations.    Has good friend and social support.  Goes to the dentist regularly.  Wears a seatbelt.    The following portions of the patient's history were reviewed and updated as appropriate: allergies, current medications, past family history, past medical history, past social history, past surgical history and problem list.    Review of Systems   Constitutional: Negative for chills, fatigue, fever and unexpected weight change.   HENT: Negative for sore throat.    Respiratory: Negative for cough, shortness of breath and wheezing.    Cardiovascular: Negative for chest pain, palpitations and leg swelling.   Gastrointestinal: Negative for abdominal distention, abdominal pain, constipation, diarrhea, nausea and vomiting.   Genitourinary: Negative for dysuria.   Musculoskeletal: Negative for arthralgias and myalgias.   Skin: Negative for rash.   Neurological: Negative for dizziness.   Psychiatric/Behavioral: Negative for confusion and dysphoric mood. The patient is not nervous/anxious.        Objective  Vitals:    10/20/20 0925   BP: 110/64   Pulse: 50   Resp: 16   Temp: 97 °F (36.1 °C)   SpO2: 99%     Body mass index is 23.81 kg/m².    Physical Exam  Vitals signs and nursing note reviewed.   Constitutional:       General: He is not in acute distress.     Appearance: Normal appearance.  He is well-developed. He is not ill-appearing.   HENT:      Right Ear: Tympanic membrane, ear canal and external ear normal.      Left Ear: Tympanic membrane, ear canal and external ear normal.      Nose: Nose normal.      Mouth/Throat:      Mouth: Mucous membranes are moist.      Pharynx: Oropharynx is clear.   Eyes:      Extraocular Movements: Extraocular movements intact.      Conjunctiva/sclera: Conjunctivae normal.      Pupils: Pupils are equal, round, and reactive to light.   Neck:      Musculoskeletal: Normal range of motion and neck supple.   Cardiovascular:      Rate and Rhythm: Normal rate and regular rhythm.      Pulses: Normal pulses.      Heart sounds: Normal heart sounds. No murmur.   Pulmonary:      Effort: Pulmonary effort is normal. No respiratory distress.      Breath sounds: Normal breath sounds. No wheezing.   Abdominal:      General: Bowel sounds are normal. There is no distension.      Palpations: Abdomen is soft.      Tenderness: There is no abdominal tenderness. There is no guarding or rebound.   Musculoskeletal: Normal range of motion.         General: No tenderness.   Skin:     General: Skin is warm and dry.   Neurological:      General: No focal deficit present.      Mental Status: He is alert and oriented to person, place, and time.      Sensory: No sensory deficit.   Psychiatric:         Mood and Affect: Mood normal.         Behavior: Behavior normal.           Current Outpatient Medications:   •  ELIQUIS 5 MG tablet tablet, TAKE 1 TABLET EVERY 12 HOURS, Disp: 180 tablet, Rfl: 2  •  Multiple Vitamins-Minerals (MULTIVITAMIN ADULT PO), Take  by mouth Daily., Disp: , Rfl:   •  Omega-3 Fatty Acids (FISH OIL) 1000 MG capsule capsule, Take  by mouth Daily With Breakfast., Disp: , Rfl:   Current outpatient and discharge medications have been reconciled for the patient.  Reviewed by: Blayne Cast MD      Procedures    Lab Results (most recent)     None                Diagnoses and all  orders for this visit:    1. Routine health maintenance (Primary)    2. Encounter for hepatitis C screening test for low risk patient  -     Hepatitis C Antibody    3. Encounter for lipid screening for cardiovascular disease  -     Lipid Panel With LDL / HDL Ratio    4. Screening for diabetes mellitus  -     Comprehensive Metabolic Panel    Orders as above.  I will contact him with results as available.  Otherwise continue with current lifestyle.    Encouraged to sign for and utilize [employer] for communication.    Any information loaded into the AVS was placed there by MATHIEU Cast MD, and patient was counseled on the same.   Return in about 1 year (around 10/20/2021).      MATHIEU Cast MD

## 2020-10-21 LAB
ALBUMIN SERPL-MCNC: 4.6 G/DL (ref 3.5–5.2)
ALBUMIN/GLOB SERPL: 1.9 G/DL
ALP SERPL-CCNC: 64 U/L (ref 39–117)
ALT SERPL-CCNC: 29 U/L (ref 1–41)
AST SERPL-CCNC: 37 U/L (ref 1–40)
BILIRUB SERPL-MCNC: 0.7 MG/DL (ref 0–1.2)
BUN SERPL-MCNC: 20 MG/DL (ref 6–20)
BUN/CREAT SERPL: 23 (ref 7–25)
CALCIUM SERPL-MCNC: 9.4 MG/DL (ref 8.6–10.5)
CHLORIDE SERPL-SCNC: 102 MMOL/L (ref 98–107)
CHOLEST SERPL-MCNC: 221 MG/DL (ref 0–200)
CO2 SERPL-SCNC: 30.1 MMOL/L (ref 22–29)
CREAT SERPL-MCNC: 0.87 MG/DL (ref 0.76–1.27)
GLOBULIN SER CALC-MCNC: 2.4 GM/DL
GLUCOSE SERPL-MCNC: 83 MG/DL (ref 65–99)
HCV AB S/CO SERPL IA: <0.1 S/CO RATIO (ref 0–0.9)
HDLC SERPL-MCNC: 103 MG/DL (ref 40–60)
LDLC SERPL CALC-MCNC: 108 MG/DL (ref 0–100)
LDLC/HDLC SERPL: 1.04 {RATIO}
POTASSIUM SERPL-SCNC: 5 MMOL/L (ref 3.5–5.2)
PROT SERPL-MCNC: 7 G/DL (ref 6–8.5)
SODIUM SERPL-SCNC: 140 MMOL/L (ref 136–145)
TRIGL SERPL-MCNC: 55 MG/DL (ref 0–150)
VLDLC SERPL CALC-MCNC: 10 MG/DL (ref 5–40)

## 2020-12-02 DIAGNOSIS — D68.51 FACTOR V LEIDEN MUTATION (HCC): ICD-10-CM

## 2020-12-02 DIAGNOSIS — Z79.01 ANTICOAGULANT LONG-TERM USE: ICD-10-CM

## 2020-12-02 DIAGNOSIS — S27.2XXD TRAUMATIC PNEUMOHEMOTHORAX, SUBSEQUENT ENCOUNTER: ICD-10-CM

## 2020-12-02 DIAGNOSIS — S22.41XD CLOSED FRACTURE OF MULTIPLE RIBS OF RIGHT SIDE WITH ROUTINE HEALING, SUBSEQUENT ENCOUNTER: ICD-10-CM

## 2020-12-02 RX ORDER — APIXABAN 5 MG/1
TABLET, FILM COATED ORAL
Qty: 180 TABLET | Refills: 0 | Status: SHIPPED | OUTPATIENT
Start: 2020-12-02 | End: 2021-02-15

## 2020-12-02 NOTE — TELEPHONE ENCOUNTER
12/2/20 CALLED AND LEFT MSG ON PT V/M NEEDS TO MAKE APPT W/DR. RODRIGUEZ- HAS NOT BEEN IN SINCE July 2019

## 2020-12-02 NOTE — TELEPHONE ENCOUNTER
----- Message from Ximena Moura sent at 12/2/2020 11:19 AM EST -----  Please call pt to set up an appointment with MD. (I think it's Dr Shin) he hasn't been seen since July of 2019. Thank you Ximena.

## 2021-01-12 ENCOUNTER — TELEPHONE (OUTPATIENT)
Dept: ONCOLOGY | Facility: CLINIC | Age: 56
End: 2021-01-12

## 2021-01-12 NOTE — TELEPHONE ENCOUNTER
Caller: OMERO ISAACS    Relationship to patient: SELF    Best call back number: [phone]    Type of visit: ANNUAL APPT    Requested date: NEXT AVAILABLE

## 2021-02-12 ENCOUNTER — TELEPHONE (OUTPATIENT)
Dept: ONCOLOGY | Facility: CLINIC | Age: 56
End: 2021-02-12

## 2021-02-12 NOTE — TELEPHONE ENCOUNTER
PT: SELF    PT CALLING TO R/S HIS 2/18 APPT.     HE IS OPEN ON MON 2/22,  2/24  -12-2PM  3/1 AFTER 3PM ON  OR 3/3 NOON-3PM

## 2021-02-12 NOTE — TELEPHONE ENCOUNTER
PT: SELF    PT CALLING TO R/S HIS 2/18 APPT     HE SAID HE CAN DO THE 3/8 ANYTIME AFTER 12PM, 3/10 ANYTIME AFTER 12PM, 3/11 ANYTIME BEFORE 10:30 OR AFTER 3PM. 3/17 3/18 BOTH OF THESE ANYTIME IN THE AFTERNOON.      PT #: [phone]

## 2021-02-15 DIAGNOSIS — Z79.01 ANTICOAGULANT LONG-TERM USE: ICD-10-CM

## 2021-02-15 DIAGNOSIS — S22.41XD CLOSED FRACTURE OF MULTIPLE RIBS OF RIGHT SIDE WITH ROUTINE HEALING, SUBSEQUENT ENCOUNTER: ICD-10-CM

## 2021-02-15 DIAGNOSIS — S27.2XXD TRAUMATIC PNEUMOHEMOTHORAX, SUBSEQUENT ENCOUNTER: ICD-10-CM

## 2021-02-15 DIAGNOSIS — D68.51 FACTOR V LEIDEN MUTATION (HCC): ICD-10-CM

## 2021-02-15 RX ORDER — APIXABAN 5 MG/1
TABLET, FILM COATED ORAL
Qty: 180 TABLET | Refills: 0 | Status: ON HOLD | OUTPATIENT
Start: 2021-02-15 | End: 2021-03-03

## 2021-02-18 ENCOUNTER — APPOINTMENT (OUTPATIENT)
Dept: LAB | Facility: HOSPITAL | Age: 56
End: 2021-02-18

## 2021-03-02 ENCOUNTER — HOSPITAL ENCOUNTER (OUTPATIENT)
Facility: HOSPITAL | Age: 56
Setting detail: OBSERVATION
Discharge: HOME | End: 2021-03-03
Attending: EMERGENCY MEDICINE | Admitting: INTERNAL MEDICINE

## 2021-03-02 ENCOUNTER — APPOINTMENT (OUTPATIENT)
Dept: CARDIOLOGY | Facility: HOSPITAL | Age: 56
End: 2021-03-02

## 2021-03-02 VITALS
RESPIRATION RATE: 16 BRPM | SYSTOLIC BLOOD PRESSURE: 123 MMHG | OXYGEN SATURATION: 97 % | BODY MASS INDEX: 23.62 KG/M2 | WEIGHT: 190 LBS | TEMPERATURE: 98.2 F | DIASTOLIC BLOOD PRESSURE: 78 MMHG | HEART RATE: 59 BPM | HEIGHT: 75 IN

## 2021-03-02 DIAGNOSIS — D68.2 FACTOR V DEFICIENCY (HCC): ICD-10-CM

## 2021-03-02 DIAGNOSIS — I82.432 ACUTE DEEP VEIN THROMBOSIS (DVT) OF POPLITEAL VEIN OF LEFT LOWER EXTREMITY (HCC): Primary | ICD-10-CM

## 2021-03-02 DIAGNOSIS — Z78.9 FAILURE OF OUTPATIENT TREATMENT: ICD-10-CM

## 2021-03-02 LAB
ALBUMIN SERPL-MCNC: 4.3 G/DL (ref 3.5–5.2)
ALBUMIN/GLOB SERPL: 1.5 G/DL
ALP SERPL-CCNC: 61 U/L (ref 39–117)
ALT SERPL W P-5'-P-CCNC: 23 U/L (ref 1–41)
ANION GAP SERPL CALCULATED.3IONS-SCNC: 9.6 MMOL/L (ref 5–15)
APTT PPP: 24.7 SECONDS (ref 22.7–35.4)
AST SERPL-CCNC: 31 U/L (ref 1–40)
BASOPHILS # BLD AUTO: 0.04 10*3/MM3 (ref 0–0.2)
BASOPHILS NFR BLD AUTO: 0.5 % (ref 0–1.5)
BH CV LOW VAS LEFT GASTRONEMIUS VESSEL: 1
BH CV LOW VAS LEFT POPLITEAL SPONT: 1
BH CV LOWER VASCULAR LEFT COMMON FEMORAL AUGMENT: NORMAL
BH CV LOWER VASCULAR LEFT COMMON FEMORAL COMPETENT: NORMAL
BH CV LOWER VASCULAR LEFT COMMON FEMORAL COMPRESS: NORMAL
BH CV LOWER VASCULAR LEFT COMMON FEMORAL PHASIC: NORMAL
BH CV LOWER VASCULAR LEFT COMMON FEMORAL SPONT: NORMAL
BH CV LOWER VASCULAR LEFT DISTAL FEMORAL COMPRESS: NORMAL
BH CV LOWER VASCULAR LEFT GASTRONEMIUS COMPRESS: NORMAL
BH CV LOWER VASCULAR LEFT GASTRONEMIUS THROMBUS: NORMAL
BH CV LOWER VASCULAR LEFT GREATER SAPH AK COMPRESS: NORMAL
BH CV LOWER VASCULAR LEFT GREATER SAPH BK COMPRESS: NORMAL
BH CV LOWER VASCULAR LEFT LESSER SAPH COMPRESS: NORMAL
BH CV LOWER VASCULAR LEFT MID FEMORAL AUGMENT: NORMAL
BH CV LOWER VASCULAR LEFT MID FEMORAL COMPETENT: NORMAL
BH CV LOWER VASCULAR LEFT MID FEMORAL COMPRESS: NORMAL
BH CV LOWER VASCULAR LEFT MID FEMORAL PHASIC: NORMAL
BH CV LOWER VASCULAR LEFT MID FEMORAL SPONT: NORMAL
BH CV LOWER VASCULAR LEFT PERONEAL COMPRESS: NORMAL
BH CV LOWER VASCULAR LEFT POPLITEAL AUGMENT: NORMAL
BH CV LOWER VASCULAR LEFT POPLITEAL COMPETENT: NORMAL
BH CV LOWER VASCULAR LEFT POPLITEAL COMPRESS: NORMAL
BH CV LOWER VASCULAR LEFT POPLITEAL PHASIC: NORMAL
BH CV LOWER VASCULAR LEFT POPLITEAL SPONT: NORMAL
BH CV LOWER VASCULAR LEFT POPLITEAL THROMBUS: NORMAL
BH CV LOWER VASCULAR LEFT POSTERIOR TIBIAL COMPRESS: NORMAL
BH CV LOWER VASCULAR LEFT PROFUNDA FEMORAL COMPRESS: NORMAL
BH CV LOWER VASCULAR LEFT PROXIMAL FEMORAL COMPRESS: NORMAL
BH CV LOWER VASCULAR LEFT SAPHENOFEMORAL JUNCTION COMPRESS: NORMAL
BH CV LOWER VASCULAR RIGHT COMMON FEMORAL AUGMENT: NORMAL
BH CV LOWER VASCULAR RIGHT COMMON FEMORAL COMPETENT: NORMAL
BH CV LOWER VASCULAR RIGHT COMMON FEMORAL COMPRESS: NORMAL
BH CV LOWER VASCULAR RIGHT COMMON FEMORAL PHASIC: NORMAL
BH CV LOWER VASCULAR RIGHT COMMON FEMORAL SPONT: NORMAL
BILIRUB SERPL-MCNC: 0.3 MG/DL (ref 0–1.2)
BUN SERPL-MCNC: 16 MG/DL (ref 6–20)
BUN/CREAT SERPL: 21.9 (ref 7–25)
CALCIUM SPEC-SCNC: 9 MG/DL (ref 8.6–10.5)
CHLORIDE SERPL-SCNC: 101 MMOL/L (ref 98–107)
CO2 SERPL-SCNC: 28.4 MMOL/L (ref 22–29)
CREAT SERPL-MCNC: 0.73 MG/DL (ref 0.76–1.27)
DEPRECATED RDW RBC AUTO: 41.1 FL (ref 37–54)
EOSINOPHIL # BLD AUTO: 0.15 10*3/MM3 (ref 0–0.4)
EOSINOPHIL NFR BLD AUTO: 1.8 % (ref 0.3–6.2)
ERYTHROCYTE [DISTWIDTH] IN BLOOD BY AUTOMATED COUNT: 11.9 % (ref 12.3–15.4)
GFR SERPL CREATININE-BSD FRML MDRD: 112 ML/MIN/1.73
GLOBULIN UR ELPH-MCNC: 2.8 GM/DL
GLUCOSE SERPL-MCNC: 81 MG/DL (ref 65–99)
HCT VFR BLD AUTO: 40.6 % (ref 37.5–51)
HGB BLD-MCNC: 14.2 G/DL (ref 13–17.7)
IMM GRANULOCYTES # BLD AUTO: 0.02 10*3/MM3 (ref 0–0.05)
IMM GRANULOCYTES NFR BLD AUTO: 0.2 % (ref 0–0.5)
INR PPP: 1.06 (ref 0.9–1.1)
LYMPHOCYTES # BLD AUTO: 2.48 10*3/MM3 (ref 0.7–3.1)
LYMPHOCYTES NFR BLD AUTO: 29.6 % (ref 19.6–45.3)
MCH RBC QN AUTO: 32.6 PG (ref 26.6–33)
MCHC RBC AUTO-ENTMCNC: 35 G/DL (ref 31.5–35.7)
MCV RBC AUTO: 93.1 FL (ref 79–97)
MONOCYTES # BLD AUTO: 0.99 10*3/MM3 (ref 0.1–0.9)
MONOCYTES NFR BLD AUTO: 11.8 % (ref 5–12)
NEUTROPHILS NFR BLD AUTO: 4.71 10*3/MM3 (ref 1.7–7)
NEUTROPHILS NFR BLD AUTO: 56.1 % (ref 42.7–76)
NRBC BLD AUTO-RTO: 0 /100 WBC (ref 0–0.2)
PLATELET # BLD AUTO: 209 10*3/MM3 (ref 140–450)
PMV BLD AUTO: 9.6 FL (ref 6–12)
POTASSIUM SERPL-SCNC: 4.1 MMOL/L (ref 3.5–5.2)
PROT SERPL-MCNC: 7.1 G/DL (ref 6–8.5)
PROTHROMBIN TIME: 13.6 SECONDS (ref 11.7–14.2)
RBC # BLD AUTO: 4.36 10*6/MM3 (ref 4.14–5.8)
SARS-COV-2 ORF1AB RESP QL NAA+PROBE: NOT DETECTED
SODIUM SERPL-SCNC: 139 MMOL/L (ref 136–145)
WBC # BLD AUTO: 8.39 10*3/MM3 (ref 3.4–10.8)

## 2021-03-02 PROCEDURE — 85730 THROMBOPLASTIN TIME PARTIAL: CPT | Performed by: PHYSICIAN ASSISTANT

## 2021-03-02 PROCEDURE — G0378 HOSPITAL OBSERVATION PER HR: HCPCS

## 2021-03-02 PROCEDURE — 93971 EXTREMITY STUDY: CPT

## 2021-03-02 PROCEDURE — 85025 COMPLETE CBC W/AUTO DIFF WBC: CPT | Performed by: PHYSICIAN ASSISTANT

## 2021-03-02 PROCEDURE — 96372 THER/PROPH/DIAG INJ SC/IM: CPT

## 2021-03-02 PROCEDURE — 99284 EMERGENCY DEPT VISIT MOD MDM: CPT

## 2021-03-02 PROCEDURE — C9803 HOPD COVID-19 SPEC COLLECT: HCPCS

## 2021-03-02 PROCEDURE — 80053 COMPREHEN METABOLIC PANEL: CPT | Performed by: PHYSICIAN ASSISTANT

## 2021-03-02 PROCEDURE — 25010000002 ENOXAPARIN PER 10 MG: Performed by: PHYSICIAN ASSISTANT

## 2021-03-02 PROCEDURE — 85610 PROTHROMBIN TIME: CPT | Performed by: PHYSICIAN ASSISTANT

## 2021-03-02 PROCEDURE — U0004 COV-19 TEST NON-CDC HGH THRU: HCPCS | Performed by: PHYSICIAN ASSISTANT

## 2021-03-02 RX ADMIN — ENOXAPARIN SODIUM 80 MG: 80 INJECTION SUBCUTANEOUS at 21:03

## 2021-03-02 NOTE — ED NOTES
Patient was placed in face mask in first look.  Patient was wearing a face mask throughout our encounter.  I wore protective eye protection throughout the encounter.  Hand hygiene was performed before and after patient encounter.        Blayne Soto RN  03/02/21 [id number]

## 2021-03-02 NOTE — ED PROVIDER NOTES
18:25 EST  Patient seen and examined with physician assistant.  Briefly patient presents for evaluation of left leg pain.  Patient has history of factor V Leiden and is on Eliquis for blood clots in the past.  Patient states he has had increasing pain in his left calf as well as lateral knee.  Patient states he has not missed any doses of his medications.  Symptoms have been present for the last day and a half.          On exam patient is alert cooperative in no distress.  Patient does have tenderness to left knee as well as calf.  Patient has normal pulse.          Plan will be Doppler of left leg      MD ATTESTATION NOTE    The ALBERTA and I have discussed this patient's history, physical exam, and treatment plan.  I have reviewed the documentation and personally had a face to face interaction with the patient. I affirm the documentation and agree with the treatment and plan.  The attached note describes my personal findings.      Patient was wearing a face mask when I entered the room and they continued to wear a mask throughout their stay in the ED.  I wore PPE, including  gloves, face mask with shield or face mask with goggles whenever I was in the room with patient.      Simba Wilson MD  03/02/21 2019

## 2021-03-02 NOTE — ED PROVIDER NOTES
EMERGENCY DEPARTMENT ENCOUNTER    Room Number:  S414/1  Date seen:  3/3/2021  Time seen: 18:14 EST  PCP: Blayne Cast MD  Historian: Patient    HPI:  Chief complaint: Leg swelling  A complete HPI/ROS/PMH/PSH/SH/FH are unobtainable due to: None  Context:Blayne Grijalva is a 55 y.o. male, hx of factor V Leiden deficiency, who presents to the ED with c/o waking up yesterday morning with pain behind his left knee.  Started to get worse today along with some left lower leg swelling and decided to come to the ER for further evaluation.  Patient has history of factor V Leiden deficiency and DVTs, he takes Eliquis.  Patient says he is compliant with his Eliquis and is followed by Dr. Shin, hematologist.  Patient denies any prolonged immobilization, recent travel, any hormone use.    Patient was placed in face mask in first look. Patient was wearing facemask when I entered the room and throughout our encounter. I wore full protective equipment throughout this patient encounter including a face mask, goggles, and gloves. Hand hygiene was performed before donning protective equipment and after removal when leaving the room.      MEDICAL RECORD REVIEW      ALLERGIES  Patient has no known allergies.    PAST MEDICAL HISTORY  Active Ambulatory Problems     Diagnosis Date Noted   • Hypercoagulable state (CMS/Former[facility]) 08/08/2016   • Factor V Leiden mutation (CMS/Former[facility]) 08/08/2016   • Anticoagulant long-term use 08/08/2016   • Anemia associated with acute blood loss 09/22/2016   • Closed fracture of multiple ribs of right side 09/22/2016   • Hemothorax with pneumothorax, traumatic 09/24/2016   • Lung laceration 09/24/2016   • Atelectasis 09/27/2016   • Factor V deficiency (CMS/HCC) 10/17/2016   • Low back pain 10/17/2016   • Rash 03/22/2014   • Simple chronic bronchitis (CMS/Former[facility]) 01/18/2017     Resolved Ambulatory Problems     Diagnosis Date Noted   • No Resolved Ambulatory Problems     Past Medical History:   Diagnosis Date    • DVT (deep venous thrombosis) (CMS/HCC)    • Factor V Leiden (CMS/HCC)        PAST SURGICAL HISTORY  Past Surgical History:   Procedure Laterality Date   • COLONOSCOPY N/A 10/19/2016    Procedure: COLONOSCOPY TO CECUM WITH HOT SNARE POLYPECTOMY AND CLIP PLACEMENT X 1;  Surgeon: Gilson Foy MD;  Location: [facility] ENDOSCOPY;  Service:    • THORACOSCOPY Right 9/23/2016    Procedure: RIGHT VIDEO ASSISTED THORACOSCOPY  WITH OPEN REDUCTION AND INTERNAL FIXATION OF MULTIPLE RIB FRACTURES, PARTIAL DECORTICATION OF RIGHT LOWER LOBE.;  Surgeon: Brijesh Arguello MD;  Location: [facility] OR;  Service:        FAMILY HISTORY  Family History   Problem Relation Age of Onset   • Ovarian cancer Mother    • Bleeding Disorder Mother    • Bleeding Disorder Father    • Hypertension Father    • Bleeding Disorder Sister    • No Known Problems Brother    • No Known Problems Daughter    • No Known Problems Son    • No Known Problems Maternal Aunt    • No Known Problems Maternal Uncle    • No Known Problems Paternal Aunt    • No Known Problems Paternal Uncle    • No Known Problems Maternal Grandmother    • No Known Problems Maternal Grandfather    • No Known Problems Paternal Grandmother    • No Known Problems Paternal Grandfather    • Bleeding Disorder Sister    • Colon cancer Neg Hx    • Prostate cancer Neg Hx        SOCIAL HISTORY  Social History     Socioeconomic History   • Marital status:      Spouse name: Not on file   • Number of children: Not on file   • Years of education: Not on file   • Highest education level: Not on file   Tobacco Use   • Smoking status: Never Smoker   • Smokeless tobacco: Never Used   Substance and Sexual Activity   • Alcohol use: Yes     Alcohol/week: 7.0 standard drinks     Types: 7 Cans of beer per week   • Drug use: No   • Sexual activity: Yes     Partners: Female     Comment: , two daughters   Social History Narrative    Customer service        REVIEW OF SYSTEMS  Review  of Systems    All systems reviewed and negative except for those discussed in HPI.     PHYSICAL EXAM    ED Triage Vitals [03/02/21 1811]   Temp Heart Rate Resp BP SpO2   96.8 °F (36 °C) 65 16 -- 98 %      Temp src Heart Rate Source Patient Position BP Location FiO2 (%)   Tympanic Monitor -- -- --     Physical Exam    I have reviewed the triage vital signs and nursing notes.      GENERAL: not distressed  HENT: nares patent  EYES: no scleral icterus  NECK: no ROM limitations  CV: regular rhythm, regular rate  RESPIRATORY: normal effort, clear to auscultation bilaterally  ABDOMEN: soft, nontender  : deferred  MUSCULOSKELETAL: no deformity; tenderness palpation behind his left knee and left calf, 2+ dorsalis pedal pulses to his left lower extremity, good capillary refill to his left lower extremity there is also mild swelling to his left lower extremity compared to the right lower extremity  NEURO: alert, moves all extremities, follows commands  SKIN: warm, dry    LAB RESULTS  Recent Results (from the past 24 hour(s))   Comprehensive Metabolic Panel    Collection Time: 03/02/21  6:30 PM    Specimen: Blood   Result Value Ref Range    Glucose 81 65 - 99 mg/dL    BUN 16 6 - 20 mg/dL    Creatinine 0.73 (L) 0.76 - 1.27 mg/dL    Sodium 139 136 - 145 mmol/L    Potassium 4.1 3.5 - 5.2 mmol/L    Chloride 101 98 - 107 mmol/L    CO2 28.4 22.0 - 29.0 mmol/L    Calcium 9.0 8.6 - 10.5 mg/dL    Total Protein 7.1 6.0 - 8.5 g/dL    Albumin 4.30 3.50 - 5.20 g/dL    ALT (SGPT) 23 1 - 41 U/L    AST (SGOT) 31 1 - 40 U/L    Alkaline Phosphatase 61 39 - 117 U/L    Total Bilirubin 0.3 0.0 - 1.2 mg/dL    eGFR Non African Amer 112 >60 mL/min/1.73    Globulin 2.8 gm/dL    A/G Ratio 1.5 g/dL    BUN/Creatinine Ratio 21.9 7.0 - 25.0    Anion Gap 9.6 5.0 - 15.0 mmol/L   CBC Auto Differential    Collection Time: 03/02/21  6:30 PM    Specimen: Blood   Result Value Ref Range    WBC 8.39 3.40 - 10.80 10*3/mm3    RBC 4.36 4.14 - 5.80 10*6/mm3     Hemoglobin 14.2 13.0 - 17.7 g/dL    Hematocrit 40.6 37.5 - 51.0 %    MCV 93.1 79.0 - 97.0 fL    MCH 32.6 26.6 - 33.0 pg    MCHC 35.0 31.5 - 35.7 g/dL    RDW 11.9 (L) 12.3 - 15.4 %    RDW-SD 41.1 37.0 - 54.0 fl    MPV 9.6 6.0 - 12.0 fL    Platelets 209 140 - 450 10*3/mm3    Neutrophil % 56.1 42.7 - 76.0 %    Lymphocyte % 29.6 19.6 - 45.3 %    Monocyte % 11.8 5.0 - 12.0 %    Eosinophil % 1.8 0.3 - 6.2 %    Basophil % 0.5 0.0 - 1.5 %    Immature Grans % 0.2 0.0 - 0.5 %    Neutrophils, Absolute 4.71 1.70 - 7.00 10*3/mm3    Lymphocytes, Absolute 2.48 0.70 - 3.10 10*3/mm3    Monocytes, Absolute 0.99 (H) 0.10 - 0.90 10*3/mm3    Eosinophils, Absolute 0.15 0.00 - 0.40 10*3/mm3    Basophils, Absolute 0.04 0.00 - 0.20 10*3/mm3    Immature Grans, Absolute 0.02 0.00 - 0.05 10*3/mm3    nRBC 0.0 0.0 - 0.2 /100 WBC   Protime-INR    Collection Time: 03/02/21  7:11 PM    Specimen: Blood   Result Value Ref Range    Protime 13.6 11.7 - 14.2 Seconds    INR 1.06 0.90 - 1.10   aPTT    Collection Time: 03/02/21  7:11 PM    Specimen: Blood   Result Value Ref Range    PTT 24.7 22.7 - 35.4 seconds   COVID-19,[facility] PANTHER,[facility] IN-HOUSE, NP/OP SWAB IN UTM/VTM/[facility] TRANSPORT MEDIA,24 HR TAT - Swab, Nasopharynx    Collection Time: 03/02/21  7:11 PM    Specimen: Nasopharynx; Swab   Result Value Ref Range    COVID19 Not Detected Not Detected - Ref. Range   Duplex Venous Lower Extremity - Left CAR    Collection Time: 03/02/21  7:18 PM   Result Value Ref Range    Left Popliteal Spont 1     Left GastronemiusSoleal Vessel 1     Right Common Femoral Spont Y     Right Common Femoral Phasic Y     Right Common Femoral Augment Y     Right Common Femoral Competent Y     Right Common Femoral Compress C     Left Common Femoral Spont Y     Left Common Femoral Phasic Y     Left Common Femoral Augment Y     Left Common Femoral Competent Y     Left Common Femoral Compress C     Left Saphenofemoral Junction Compress C     Left Profunda Femoral Compress C      Left Proximal Femoral Compress C     Left Mid Femoral Spont Y     Left Mid Femoral Phasic Y     Left Mid Femoral Augment Y     Left Mid Femoral Competent Y     Left Mid Femoral Compress C     Left Distal Femoral Compress C     Left Popliteal Spont Y     Left Popliteal Phasic Y     Left Popliteal Augment Y     Left Popliteal Competent Y     Left Popliteal Compress P     Left Popliteal Thrombus A     Left Posterior Tibial Compress C     Left Peroneal Compress C     Left GastronemiusSoleal Compress N     Left GastronemiusSoleal Thrombus A     Left Greater Saph AK Compress C     Left Greater Saph BK Compress C     Left Lesser Saph Compress C          RADIOLOGY RESULTS  No orders to display         PROGRESS, DATA ANALYSIS, CONSULTS AND MEDICAL DECISION MAKING  All labs have been independently reviewed by me.  All radiology studies have been reviewed by me and discussed with radiologist dictating the report. Discussion below represents my analysis of pertinent findings related to patient's condition, differential diagnosis, treatment plan and final disposition.     ED Course as of Mar 03 0046   Tue Mar 02, 2021   2100 Despite being compliant with his Eliquis, patient has an acute DVT to left popliteal vein.  I am unsure why he is developing DVTs when he is on Eliquis for his factor V Leiden deficiency.  Believe patient needs to be admitted for further evaluation.    [SS]   2120 I discussed with Dr. Borrero, hematologist regarding patient.  He is requesting to give patient Lovenox and will consult patient and requesting hospitalist to admit.    [SS]   2151 I discussed with DAVID Weathers regarding patient.  Agrees admit patient and all questions rest of the time.    [SS]      ED Course User Index  [SS] Suzette Hope, DAVID       The differential diagnosis include but are not limited to DVT, fracture, pulmonary embolism.       Reviewed pt's history and workup with Dr. Wilson.  After a bedside evaluation, Dr. Wilson  "agrees with the plan of care.    ised them to follow up with their primary physician or local health clinic.    (FOR ADMIT) Based on the patient's lab findings and presenting symptoms, the doctor and I feel it is appropriate to admit the patient for further management, evaluation, and treatment.  I have discussed this with the admitting team.  I have also discussed this with the patient/family.  They are in agreement with admission.          Disposition vitals:  /78 (BP Location: Left arm, Patient Position: Lying)   Pulse 59   Temp 98.2 °F (36.8 °C) (Oral)   Resp 16   Ht 190.5 cm (75\")   Wt 86.2 kg (190 lb)   SpO2 97%   BMI 23.75 kg/m²       DIAGNOSIS  Final diagnoses:   Acute deep vein thrombosis (DVT) of popliteal vein of left lower extremity (CMS/HCC)   Failure of outpatient treatment   Factor V deficiency (CMS/HCC)       FOLLOW UP   No follow-up provider specified.       Suzette Hope PA-C  03/03/21 0046    "

## 2021-03-02 NOTE — ED NOTES
Patient was placed in face mask in first look.  Patient was wearing a face mask throughout our encounter.  I wore protective eye protection throughout the encounter.  Hand hygiene was performed before and after patient encounter.        Blayne Soto RN  03/02/21 5021

## 2021-03-02 NOTE — ED TRIAGE NOTES
Pt has factor 5 leiden and he has left leg swelling and pain behind his knee.      Patient was placed in face mask during first look triage.  Patient was wearing a face mask throughout encounter.  I wore personal protective equipment throughout the encounter.  Hand hygiene was performed before and after patient encounter.

## 2021-03-03 ENCOUNTER — TELEPHONE (OUTPATIENT)
Dept: ONCOLOGY | Facility: CLINIC | Age: 56
End: 2021-03-03

## 2021-03-03 ENCOUNTER — READMISSION MANAGEMENT (OUTPATIENT)
Dept: CALL CENTER | Facility: HOSPITAL | Age: 56
End: 2021-03-03

## 2021-03-03 DIAGNOSIS — D68.2 FACTOR V DEFICIENCY (HCC): Primary | ICD-10-CM

## 2021-03-03 PROBLEM — R00.1 SINUS BRADYCARDIA, CHRONIC: Status: ACTIVE | Noted: 2021-03-03

## 2021-03-03 LAB
ALBUMIN SERPL-MCNC: 3.9 G/DL (ref 3.5–5.2)
ALBUMIN/GLOB SERPL: 1.6 G/DL
ALP SERPL-CCNC: 54 U/L (ref 39–117)
ALT SERPL W P-5'-P-CCNC: 21 U/L (ref 1–41)
ANION GAP SERPL CALCULATED.3IONS-SCNC: 8.3 MMOL/L (ref 5–15)
AST SERPL-CCNC: 28 U/L (ref 1–40)
BASOPHILS # BLD AUTO: 0.03 10*3/MM3 (ref 0–0.2)
BASOPHILS NFR BLD AUTO: 0.4 % (ref 0–1.5)
BILIRUB SERPL-MCNC: 0.6 MG/DL (ref 0–1.2)
BUN SERPL-MCNC: 13 MG/DL (ref 6–20)
BUN/CREAT SERPL: 18.3 (ref 7–25)
CALCIUM SPEC-SCNC: 8.7 MG/DL (ref 8.6–10.5)
CHLORIDE SERPL-SCNC: 105 MMOL/L (ref 98–107)
CO2 SERPL-SCNC: 27.7 MMOL/L (ref 22–29)
CREAT SERPL-MCNC: 0.71 MG/DL (ref 0.76–1.27)
DEPRECATED RDW RBC AUTO: 42.8 FL (ref 37–54)
EOSINOPHIL # BLD AUTO: 0.19 10*3/MM3 (ref 0–0.4)
EOSINOPHIL NFR BLD AUTO: 2.4 % (ref 0.3–6.2)
ERYTHROCYTE [DISTWIDTH] IN BLOOD BY AUTOMATED COUNT: 12.3 % (ref 12.3–15.4)
GFR SERPL CREATININE-BSD FRML MDRD: 115 ML/MIN/1.73
GLOBULIN UR ELPH-MCNC: 2.5 GM/DL
GLUCOSE SERPL-MCNC: 85 MG/DL (ref 65–99)
HCT VFR BLD AUTO: 40.5 % (ref 37.5–51)
HGB BLD-MCNC: 13.5 G/DL (ref 13–17.7)
IMM GRANULOCYTES # BLD AUTO: 0.02 10*3/MM3 (ref 0–0.05)
IMM GRANULOCYTES NFR BLD AUTO: 0.3 % (ref 0–0.5)
LYMPHOCYTES # BLD AUTO: 2.43 10*3/MM3 (ref 0.7–3.1)
LYMPHOCYTES NFR BLD AUTO: 31.3 % (ref 19.6–45.3)
MCH RBC QN AUTO: 31.8 PG (ref 26.6–33)
MCHC RBC AUTO-ENTMCNC: 33.3 G/DL (ref 31.5–35.7)
MCV RBC AUTO: 95.5 FL (ref 79–97)
MONOCYTES # BLD AUTO: 0.95 10*3/MM3 (ref 0.1–0.9)
MONOCYTES NFR BLD AUTO: 12.2 % (ref 5–12)
NEUTROPHILS NFR BLD AUTO: 4.14 10*3/MM3 (ref 1.7–7)
NEUTROPHILS NFR BLD AUTO: 53.4 % (ref 42.7–76)
NRBC BLD AUTO-RTO: 0 /100 WBC (ref 0–0.2)
PLATELET # BLD AUTO: 201 10*3/MM3 (ref 140–450)
PMV BLD AUTO: 9.8 FL (ref 6–12)
POTASSIUM SERPL-SCNC: 4.1 MMOL/L (ref 3.5–5.2)
PROT SERPL-MCNC: 6.4 G/DL (ref 6–8.5)
RBC # BLD AUTO: 4.24 10*6/MM3 (ref 4.14–5.8)
SODIUM SERPL-SCNC: 141 MMOL/L (ref 136–145)
WBC # BLD AUTO: 7.76 10*3/MM3 (ref 3.4–10.8)

## 2021-03-03 PROCEDURE — 99244 OFF/OP CNSLTJ NEW/EST MOD 40: CPT | Performed by: INTERNAL MEDICINE

## 2021-03-03 PROCEDURE — 86147 CARDIOLIPIN ANTIBODY EA IG: CPT | Performed by: INTERNAL MEDICINE

## 2021-03-03 PROCEDURE — 85732 THROMBOPLASTIN TIME PARTIAL: CPT | Performed by: INTERNAL MEDICINE

## 2021-03-03 PROCEDURE — 80053 COMPREHEN METABOLIC PANEL: CPT | Performed by: NURSE PRACTITIONER

## 2021-03-03 PROCEDURE — 86146 BETA-2 GLYCOPROTEIN ANTIBODY: CPT | Performed by: INTERNAL MEDICINE

## 2021-03-03 PROCEDURE — 85025 COMPLETE CBC W/AUTO DIFF WBC: CPT | Performed by: NURSE PRACTITIONER

## 2021-03-03 PROCEDURE — G0378 HOSPITAL OBSERVATION PER HR: HCPCS

## 2021-03-03 PROCEDURE — 85705 THROMBOPLASTIN INHIBITION: CPT | Performed by: INTERNAL MEDICINE

## 2021-03-03 PROCEDURE — 96372 THER/PROPH/DIAG INJ SC/IM: CPT

## 2021-03-03 PROCEDURE — 85670 THROMBIN TIME PLASMA: CPT | Performed by: INTERNAL MEDICINE

## 2021-03-03 PROCEDURE — 25010000002 ENOXAPARIN PER 10 MG: Performed by: NURSE PRACTITIONER

## 2021-03-03 PROCEDURE — 85613 RUSSELL VIPER VENOM DILUTED: CPT | Performed by: INTERNAL MEDICINE

## 2021-03-03 RX ORDER — WARFARIN SODIUM 5 MG/1
17.5 TABLET ORAL DAILY
Qty: 105 TABLET | Refills: 0 | Status: SHIPPED | OUTPATIENT
Start: 2021-03-03 | End: 2021-03-10 | Stop reason: SDUPTHER

## 2021-03-03 RX ORDER — BISACODYL 5 MG/1
5 TABLET, DELAYED RELEASE ORAL DAILY PRN
Status: DISCONTINUED | OUTPATIENT
Start: 2021-03-03 | End: 2021-03-03 | Stop reason: HOSPADM

## 2021-03-03 RX ORDER — ONDANSETRON 4 MG/1
4 TABLET, FILM COATED ORAL EVERY 6 HOURS PRN
Status: DISCONTINUED | OUTPATIENT
Start: 2021-03-03 | End: 2021-03-03 | Stop reason: HOSPADM

## 2021-03-03 RX ORDER — ALUMINA, MAGNESIA, AND SIMETHICONE 2400; 2400; 240 MG/30ML; MG/30ML; MG/30ML
15 SUSPENSION ORAL EVERY 6 HOURS PRN
Status: DISCONTINUED | OUTPATIENT
Start: 2021-03-03 | End: 2021-03-03 | Stop reason: HOSPADM

## 2021-03-03 RX ORDER — BISACODYL 10 MG
10 SUPPOSITORY, RECTAL RECTAL DAILY PRN
Status: DISCONTINUED | OUTPATIENT
Start: 2021-03-03 | End: 2021-03-03 | Stop reason: HOSPADM

## 2021-03-03 RX ORDER — HYDROCODONE BITARTRATE AND ACETAMINOPHEN 5; 325 MG/1; MG/1
1 TABLET ORAL EVERY 6 HOURS PRN
Status: DISCONTINUED | OUTPATIENT
Start: 2021-03-03 | End: 2021-03-03 | Stop reason: HOSPADM

## 2021-03-03 RX ORDER — NITROGLYCERIN 0.4 MG/1
0.4 TABLET SUBLINGUAL
Status: DISCONTINUED | OUTPATIENT
Start: 2021-03-03 | End: 2021-03-03 | Stop reason: HOSPADM

## 2021-03-03 RX ORDER — SODIUM CHLORIDE 0.9 % (FLUSH) 0.9 %
10 SYRINGE (ML) INJECTION AS NEEDED
Status: DISCONTINUED | OUTPATIENT
Start: 2021-03-03 | End: 2021-03-03 | Stop reason: HOSPADM

## 2021-03-03 RX ORDER — SODIUM CHLORIDE 0.9 % (FLUSH) 0.9 %
10 SYRINGE (ML) INJECTION EVERY 12 HOURS SCHEDULED
Status: DISCONTINUED | OUTPATIENT
Start: 2021-03-03 | End: 2021-03-03 | Stop reason: HOSPADM

## 2021-03-03 RX ORDER — ACETAMINOPHEN 325 MG/1
650 TABLET ORAL EVERY 6 HOURS PRN
Start: 2021-03-03 | End: 2021-11-04

## 2021-03-03 RX ORDER — ACETAMINOPHEN 325 MG/1
650 TABLET ORAL EVERY 4 HOURS PRN
Status: DISCONTINUED | OUTPATIENT
Start: 2021-03-03 | End: 2021-03-03 | Stop reason: HOSPADM

## 2021-03-03 RX ORDER — ONDANSETRON 2 MG/ML
4 INJECTION INTRAMUSCULAR; INTRAVENOUS EVERY 6 HOURS PRN
Status: DISCONTINUED | OUTPATIENT
Start: 2021-03-03 | End: 2021-03-03 | Stop reason: HOSPADM

## 2021-03-03 RX ADMIN — ENOXAPARIN SODIUM 90 MG: 100 INJECTION SUBCUTANEOUS at 08:04

## 2021-03-03 RX ADMIN — SODIUM CHLORIDE, PRESERVATIVE FREE 10 ML: 5 INJECTION INTRAVENOUS at 08:05

## 2021-03-03 RX ADMIN — HYDROCODONE BITARTRATE AND ACETAMINOPHEN 1 TABLET: 5; 325 TABLET ORAL at 08:03

## 2021-03-03 RX ADMIN — HYDROCODONE BITARTRATE AND ACETAMINOPHEN 1 TABLET: 5; 325 TABLET ORAL at 02:04

## 2021-03-03 NOTE — TELEPHONE ENCOUNTER
----- Message from Mian Borrero MD sent at 3/3/2021 12:46 PM EST -----  Regarding: Hospital follow-up  He needs a 1 unit hospital follow-up appointment in 1 week with a nurse practitioner with a CBC and PT/INR.  He can keep his currently scheduled appointment with Dr. Shin on 3/18.    Thanks, GUS

## 2021-03-03 NOTE — PLAN OF CARE
Goal Outcome Evaluation:  Plan of Care Reviewed With: patient  Progress: no change  Outcome Summary: Patient admitted for DVT left leg, is very athletic and in very good shape.  Hx of Factor 5, hx of blood clots d/t this, takes Eliquis.  C/O pain behind left knee that started on Monday, pedal pulses palpable bilaterally, equal and strong, color of ext is pink and warm. C/O pain, orders obtained for pain med.

## 2021-03-03 NOTE — PROGRESS NOTES
Continued Stay Note  [facility]     Patient Name: Blayne Grijalva  MRN: [id number]  Today's Date: 3/3/2021    Admit Date: 3/2/2021    Discharge Plan     Row Name 03/03/21 1547       Plan    Plan  Home with his spouse and denies needs    Plan Comments  Patient states he can afford his Lovenox and has no other needs for dc.  ......................Divina Chaparro RN        Discharge Codes    No documentation.       Expected Discharge Date and Time     Expected Discharge Date Expected Discharge Time    Mar 3, 2021             Divina Chaparro RN

## 2021-03-03 NOTE — PROGRESS NOTES
Left LE venous duplex complete.  Preliminary report: Positive for acute DVT and calf DVT in left LE, reported to Simba Wilson MD and Blayne Soto RN.

## 2021-03-03 NOTE — PROGRESS NOTES
"Pharmacy Consult - [facility]    Blayne Grijalva has been consulted for pharmacy to dose enoxaparin for For DVT/PE (active thrombosis)  per Jasiel Holder's request.     Radiology:  Duplex Venous Lower Extremity - Left CAR [[id number]] Anant as Reviewed   Order Status: Completed Collected: 03/02/21 1826    Updated: 03/02/21 2008    Left Popliteal Spont 1    Left GastronemiusSoleal Vessel 1    Right Common Femoral Spont Y    Right Common Femoral Phasic Y    Right Common Femoral Augment Y    Right Common Femoral Competent Y    Right Common Femoral Compress C    Left Common Femoral Spont Y    Left Common Femoral Phasic Y    Left Common Femoral Augment Y    Left Common Femoral Competent Y    Left Common Femoral Compress C    Left Saphenofemoral Junction Compress C    Left Profunda Femoral Compress C    Left Proximal Femoral Compress C    Left Mid Femoral Spont Y    Left Mid Femoral Phasic Y    Left Mid Femoral Augment Y    Left Mid Femoral Competent Y    Left Mid Femoral Compress C    Left Distal Femoral Compress C    Left Popliteal Spont Y    Left Popliteal Phasic Y    Left Popliteal Augment Y    Left Popliteal Competent Y    Left Popliteal Compress P    Left Popliteal Thrombus A    Left Posterior Tibial Compress C    Left Peroneal Compress C    Left GastronemiusSoleal Compress N    Left GastronemiusSoleal Thrombus A    Left Greater Saph AK Compress C    Left Greater Saph BK Compress C    Left Lesser Saph Compress C   Narrative:     · Acute left lower extremity deep vein thrombosis noted in the popliteal   and gastrocnemius.   · All other left sided veins appeared normal.           Relevant clinical data and objective history reviewed:  55 y.o. male 190.5 cm (75\") 86.2 kg (190 lb)    Home Anticoagulation: eliquis    Past Medical History:   Diagnosis Date    DVT (deep venous thrombosis) (CMS/HCC)     Factor V Leiden (CMS/HCC)     Hypercoagulable state (CMS/HCC)      has No Known Allergies.    Lab Results   Component Value " Date    WBC 8.39 03/02/2021    HGB 14.2 03/02/2021    HCT 40.6 03/02/2021    MCV 93.1 03/02/2021     03/02/2021     Lab Results   Component Value Date    GLUCOSE 81 03/02/2021    CALCIUM 9.0 03/02/2021     03/02/2021    K 4.1 03/02/2021    CO2 28.4 03/02/2021     03/02/2021    BUN 16 03/02/2021    CREATININE 0.73 (L) 03/02/2021    EGFRIFAFRI 111 10/20/2020    EGFRIFNONA 112 03/02/2021    BCR 21.9 03/02/2021    ANIONGAP 9.6 03/02/2021       Estimated Creatinine Clearance: 139.4 mL/min (A) (by C-G formula based on SCr of 0.73 mg/dL (L)).    Active Inpatient Anticoagulation Orders: none    Assessment/Plan    Will start patient on 90 mg (1 mg/kg) subcutaneous every 12 hours, adjusted for renal function. Labs to be ordered to be ordered by clinical pharmacist if indicated. Pharmacy will continue to follow.     Alejo Uribe RPH

## 2021-03-03 NOTE — CONSULTS
Hematology/oncology consult note    REASON FOR CONSULTATION:    Acute LLE DVT in the popliteal and gastronemious veins  History of recurrent DVT on chronic anticoagulation with Eliquis  Factor V Leiden mutation    HISTORY OF PRESENT ILLNESS:  Blayne Grijalva is a 55 y.o. male who is referred today for further evaluation of recurrent LLE DVT.    He has a history of recurrent left lower extremity DVT.  His last DVT he states was about 20 years ago.  He states he is a homozygote for the factor V Leiden mutation.  He was on warfarin for many years but a couple of years ago switched to Eliquis 5 mg twice daily.  He has been taking this without interruption and he denies any missed doses.    He has been exercising and is very active.  No bleeding problems.  He states that he developed pain behind the left knee a couple of days ago after some vigorous exercise.  He then developed swelling yesterday that worsened and he presented to the emergency department.  A left lower extremity venous duplex shows DVT in the gastrocnemius and popliteal veins.  He was started on Lovenox and admitted.    He otherwise states that he has been doing well.  No shortness of breath or chest pain.  No recent immobilization.        Past Medical History:   Diagnosis Date   • DVT (deep venous thrombosis) (CMS/HCC)    • Factor V Leiden (CMS/HCC)    • Hypercoagulable state (CMS/HCC)        Past Surgical History:   Procedure Laterality Date   • COLONOSCOPY N/A 10/19/2016    Procedure: COLONOSCOPY TO CECUM WITH HOT SNARE POLYPECTOMY AND CLIP PLACEMENT X 1;  Surgeon: Gilson Foy MD;  Location: [facility] ENDOSCOPY;  Service:    • THORACOSCOPY Right 9/23/2016    Procedure: RIGHT VIDEO ASSISTED THORACOSCOPY  WITH OPEN REDUCTION AND INTERNAL FIXATION OF MULTIPLE RIB FRACTURES, PARTIAL DECORTICATION OF RIGHT LOWER LOBE.;  Surgeon: Brijesh Arguello MD;  Location: [facility] OR;  Service:        SOCIAL HISTORY:   reports that he has never smoked. He has  "never used smokeless tobacco. He reports current alcohol use of about 7.0 standard drinks of alcohol per week. He reports that he does not use drugs.    FAMILY HISTORY:  family history includes Bleeding Disorder in his father, mother, sister, and sister; Hypertension in his father; No Known Problems in his brother, daughter, maternal aunt, maternal grandfather, maternal grandmother, maternal uncle, paternal aunt, paternal grandfather, paternal grandmother, paternal uncle, and son; Ovarian cancer in his mother.    ALLERGIES:  No Known Allergies    MEDICATIONS:  The medication list has been reviewed with the patient by the medical assistant, and the list has been updated in the electronic medical record, which I reviewed.  Medication dosages and frequencies were confirmed to be accurate.    Review of Systems   Cardiovascular: Positive for leg swelling.   Musculoskeletal:        Left lower extremity discomfort behind the left knee associated with swelling   All other systems reviewed and are negative.      Vitals:    03/02/21 1930 03/02/21 1945 03/02/21 2250 03/02/21 2304   BP: 105/78   123/78   BP Location:    Left arm   Patient Position:    Lying   Pulse: 56 56  59   Resp:    16   Temp:    98.2 °F (36.8 °C)   TempSrc:    Oral   SpO2: 97% 99%  97%   Weight:   86.2 kg (190 lb)    Height:   190.5 cm (75\")        Physical Exam  Vitals signs and nursing note reviewed.   Constitutional:       General: He is not in acute distress.     Appearance: He is well-developed.   HENT:      Head: Normocephalic and atraumatic. Hair is normal.      Comments: Wearing a facemask  Eyes:      General: Lids are normal.      Conjunctiva/sclera: Conjunctivae normal.      Pupils: Pupils are equal.   Neck:      Musculoskeletal: Neck supple.      Thyroid: No thyroid mass or thyromegaly.      Vascular: No JVD.   Cardiovascular:      Rate and Rhythm: Normal rate and regular rhythm.      Heart sounds: No murmur. No friction rub. No gallop.  "   Pulmonary:      Effort: Pulmonary effort is normal. No respiratory distress.      Breath sounds: Normal breath sounds. No wheezing or rales.   Abdominal:      General: There is no distension.      Palpations: Abdomen is soft. There is no hepatomegaly, splenomegaly or mass.      Tenderness: There is no abdominal tenderness. There is no guarding.   Musculoskeletal: Normal range of motion.         General: No tenderness or deformity.      Left lower leg: Edema (Trace) present.   Lymphadenopathy:      Cervical: No cervical adenopathy.      Upper Body:      Right upper body: No supraclavicular or axillary adenopathy.      Left upper body: No supraclavicular or axillary adenopathy.   Skin:     General: Skin is warm and dry.      Findings: No rash.   Neurological:      Mental Status: He is alert and oriented to person, place, and time.   Psychiatric:         Behavior: Behavior normal.         Thought Content: Thought content normal.         Judgment: Judgment normal.         DIAGNOSTIC DATA:  Duplex Venous Lower Extremity - Left CAR (03/02/2021 19:18)  CBC Auto Differential (03/03/2021 04:08)  Comprehensive Metabolic Panel (03/03/2021 04:08)      ASSESSMENT:  This is a 55 y.o. male with:    *Factor V Leiden mutation  • The patient states that he is a homozygote    *Recurrent DVT  • Recurrent left lower extremity DVT last about 20 years ago  • He was on warfarin for many years but made a transition to Eliquis a couple of years ago and he has been chronically anticoagulated with Eliquis 5 mg bid  • New LLE DVT in the popliteal and gastronemius veins on 3/2/2021  • No provoking factors    PLAN:   1. I have communicated with Dr. Shin his usual outpatient hematologist.  We think it is reasonable to evaluate for lupus anticoagulant and antiphospholipid antibodies.  For now, plan to continue Lovenox 90 mg every 12 hours with plans to resume warfarin for chronic use as well.  There is little data to support a different DOAC in  this situation.  2. I will have Alessandra Gallagher in my office work on the Lovenox.  I will request the antiphospholipid antibody labs.  3. I advised him to resume wearing his left lower extremity compression stocking and to avoid vigorous exercise for about a week but he can be up on his feet otherwise  4. Resume warfarin.  He was previously on 125 mg weekly utilizing 5 mg pills and taking 17.5 mg daily.  5. Okay for discharge from my standpoint assuming we can get the Lovenox and I will arrange follow-up in the office in 1 week to check his INR.  He already has an annual follow-up appointment with Dr. Shin scheduled on March 18.

## 2021-03-03 NOTE — TELEPHONE ENCOUNTER
Staff message rec from Dr Borrero-pt is currently in the hospital with recurrent DVT. He was on Eliquis. He asks for me to assist with getting Lovenox 90 mg every 12 hours for pt for at least a week. Plan is for pt to resume Warfarin as well.    I have routed the rx to Dr Borrero (a weeks supply with 1 refill) for signature. Once he signs the rx-it will be sent to [facility] since pt is there in the hospital.    Confirmation rec that Dr Borrero has signed the Enoxaparin rx. This was escribed to  Mian Wilson MD sent to Alessandra Gallagher,     He is in the hospital with recurrent DVT.  We manage his anticoagulation.  He has now had a DVT on Eliquis.  I talked with Dr. Shin.  Could you please assist with obtaining Lovenox 90 mg every 12 hours for him at least for a week?  Plan to resume his warfarin as well.     Thanks!  GUS

## 2021-03-03 NOTE — PLAN OF CARE
Goal Outcome Evaluation:  Plan of Care Reviewed With: patient  Progress: improving  Outcome Summary: VSS, on RA. Left extremity hot/painful, PRN medication as orderd. Heme/onc recommends warfarin and lovenox and ok to d/c home. f/u in 1 week. will continue to monitor
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
## 2021-03-03 NOTE — DISCHARGE SUMMARY
Patient Name: Blayne Grijalva  : 1965  MRN: [id number]    Date of Admission: 3/2/2021  Date of Discharge:  3/3/2021  Primary Care Physician: Blayne Cast MD      Chief Complaint:   Leg Pain and Leg Swelling      Discharge Diagnoses     Active Hospital Problems    Diagnosis  POA   • **Acute deep vein thrombosis (DVT) of popliteal vein of left lower extremity (CMS/[facility]) [I82.432]  Yes   • Sinus bradycardia, chronic [R00.1]  Yes   • Factor V deficiency (CMS/[facility]) [D68.2]  Yes   • Hypercoagulable state (CMS/[facility]) [D68.59]  Yes   • Anticoagulant long-term use [Z79.01]  Not Applicable      Resolved Hospital Problems   No resolved problems to display.        Hospital Course     Mr. Grijalva is a 55 y.o. man with Facter IV leiden mutation admitted for left popliteal and gastrocnemius thromboses.  Eliquis changed to Lovenox and Coumadin.  INR to be done at [facility] office on 3/10/2021.  Patient is feeling better and is ready for discharge as discussed with Dr. Borrero.     Stable condition; good prognosis.    Day of Discharge     See my note from earlier today for exam and subjective.    Physical Exam:  Temp:  [96.8 °F (36 °C)-98.2 °F (36.8 °C)] 98.2 °F (36.8 °C)  Heart Rate:  [56-65] 59  Resp:  [16] 16  BP: (105-123)/(78-82) 123/78  Body mass index is 23.75 kg/m².  Physical Exam    Consultants     Consult Orders (all) (From admission, onward)     Start     Ordered    21 0025  Hematology & Oncology Inpatient Consult  Once     Specialty:  Hematology and Oncology  Provider:  Lenny Shin MD    21  [facility] (on-call MD unless specified) Details  Once     Specialty:  Hospitalist  Provider:  (Not yet assigned)    21  IP General Consult (Use specialty-specific consult if known)  Once     Provider:  Medardo Go MD    21              Procedures     * Surgery not found *      Imaging Results (All)     None        Results for orders placed  during the hospital encounter of 03/02/21   Duplex Venous Lower Extremity - Left CAR    Narrative · Acute left lower extremity deep vein thrombosis noted in the popliteal   and gastrocnemius.  · All other left sided veins appeared normal.           Pertinent Labs     Results from last 7 days   Lab Units 03/03/21  0408 03/02/21  1830   WBC 10*3/mm3 7.76 8.39   HEMOGLOBIN g/dL 13.5 14.2   PLATELETS 10*3/mm3 201 209     Results from last 7 days   Lab Units 03/03/21  0408 03/02/21  1830   SODIUM mmol/L 141 139   POTASSIUM mmol/L 4.1 4.1   CHLORIDE mmol/L 105 101   CO2 mmol/L 27.7 28.4   BUN mg/dL 13 16   CREATININE mg/dL 0.71* 0.73*   GLUCOSE mg/dL 85 81   Estimated Creatinine Clearance: 143.3 mL/min (A) (by C-G formula based on SCr of 0.71 mg/dL (L)).  Results from last 7 days   Lab Units 03/03/21  0408 03/02/21  1830   ALBUMIN g/dL 3.90 4.30   BILIRUBIN mg/dL 0.6 0.3   ALK PHOS U/L 54 61   AST (SGOT) U/L 28 31   ALT (SGPT) U/L 21 23     Results from last 7 days   Lab Units 03/03/21  0408 03/02/21  1830   CALCIUM mg/dL 8.7 9.0   ALBUMIN g/dL 3.90 4.30               Invalid input(s): LDLCALC        Test Results Pending at Discharge     Pending Labs     Order Current Status    Anticardiolipin Antibody, IgG / M, Qn Collected (03/03/21 1421)    Beta-2 Glycoprotein Antibodies Collected (03/03/21 1421)    Lupus Anticoagulant Collected (03/03/21 1421)          Discharge Details        Discharge Medications      New Medications      Instructions Start Date   acetaminophen 325 MG tablet  Commonly known as: TYLENOL   650 mg, Oral, Every 6 Hours PRN      enoxaparin 100 MG/ML solution syringe  Commonly known as: LOVENOX   90 mg, Subcutaneous, Every 12 Hours Scheduled      warfarin 5 MG tablet  Commonly known as: COUMADIN   17.5 mg, Oral, Daily         Continue These Medications      Instructions Start Date   fish oil 1000 MG capsule capsule   Oral, Daily With Breakfast      multivitamin with minerals tablet tablet   Oral, Daily              No Known Allergies      Discharge Disposition:  Home or Self Care    Discharge Diet:  Diet Order   Procedures   • Diet Regular       Discharge Activity:   Activity Instructions     Activity as Tolerated      No strenuous exercise for 1 week.  Resume compression stocking left leg.          CODE STATUS:    Code Status and Medical Interventions:   Ordered at: 03/03/21 0023     Code Status:    CPR     Medical Interventions (Level of Support Prior to Arrest):    Full       Future Appointments   Date Time Provider Department Center   3/10/2021  1:00 PM LAB CHAIR 1 [facility]  [facility] [place]   3/10/2021  1:40 PM Elba Saldaña APRN [facility] [place]   3/18/2021  4:10 PM LAB CHAIR 1 [facility]  [facility] [place]   3/18/2021  4:40 PM Lenny Shin MD [facility] [place]     Follow-up Information     Blayne Cast MD Follow up.    Specialty: Family Medicine  Why: As needed  Contact information:  [facility] [id number] [phone]                   Time Spent on Discharge:  Greater than 30 minutes.  Discussed with Dr. Adair CCP and patient's nurse      Joann Lackey MD  [facility]  03/03/21  14:22 EST

## 2021-03-03 NOTE — ED NOTES
First encounter with patient. This nurse in appropriate PPE and mask. Pt in mask.      Marietta Bacon, RN  03/02/21 1921

## 2021-03-04 ENCOUNTER — TRANSITIONAL CARE MANAGEMENT TELEPHONE ENCOUNTER (OUTPATIENT)
Dept: CALL CENTER | Facility: HOSPITAL | Age: 56
End: 2021-03-04

## 2021-03-04 NOTE — OUTREACH NOTE
Call Center TCM Note      Responses   [facility] patient discharged from?  [facility]   Does the patient have one of the following disease processes/diagnoses(primary or secondary)?  Other   TCM attempt successful?  No   Unsuccessful attempts  Attempt 2          Maida Dockery RN    3/4/2021, 17:16 EST

## 2021-03-04 NOTE — PROGRESS NOTES
Case Management Discharge Note      Final Note: home         Selected Continued Care - Discharged on 3/3/2021 Admission date: 3/2/2021 - Discharge disposition: Home or Self Care    Destination    No services have been selected for the patient.              Durable Medical Equipment    No services have been selected for the patient.              Dialysis/Infusion    No services have been selected for the patient.              Home Medical Care    No services have been selected for the patient.              Therapy    No services have been selected for the patient.              Community Resources    No services have been selected for the patient.                  Transportation Services  Private: Car    Final Discharge Disposition Code: 01 - home or self-care

## 2021-03-04 NOTE — OUTREACH NOTE
Call Center TCM Note      Responses   [facility] patient discharged from?  [facility]   Does the patient have one of the following disease processes/diagnoses(primary or secondary)?  Other   TCM attempt successful?  No   Unsuccessful attempts  Attempt 1          Maida Dockery RN    3/4/2021, 14:18 EST

## 2021-03-04 NOTE — OUTREACH NOTE
Prep Survey      Responses   [facility] facility patient discharged from?  [facility]   Is LACE score < 7 ?  Yes   Emergency Room discharge w/ pulse ox?  No   Eligibility  [facility]   Date of Admission  03/02/21   Date of Discharge  03/03/21   Discharge Disposition  Home or Self Care   Discharge diagnosis  DVT   Does the patient have one of the following disease processes/diagnoses(primary or secondary)?  Other   Does the patient have Home health ordered?  No   Is there a DME ordered?  No   Prep survey completed?  Yes          Angi Christianson RN

## 2021-03-05 ENCOUNTER — DOCUMENTATION (OUTPATIENT)
Dept: ONCOLOGY | Facility: CLINIC | Age: 56
End: 2021-03-05

## 2021-03-05 ENCOUNTER — TRANSITIONAL CARE MANAGEMENT TELEPHONE ENCOUNTER (OUTPATIENT)
Dept: CALL CENTER | Facility: HOSPITAL | Age: 56
End: 2021-03-05

## 2021-03-05 LAB
CARDIOLIPIN IGG SER IA-ACNC: <9 GPL U/ML (ref 0–14)
CARDIOLIPIN IGM SER IA-ACNC: <9 MPL U/ML (ref 0–12)

## 2021-03-05 NOTE — OUTREACH NOTE
Call Center TCM Note      Responses   [facility] patient discharged from?  [facility]   Does the patient have one of the following disease processes/diagnoses(primary or secondary)?  Other   TCM attempt successful?  No   Unsuccessful attempts  Attempt 3          Dorian Thomas RN    3/5/2021, 13:00 EST

## 2021-03-06 LAB
APTT SCREEN TO CONFIRM RATIO: 0.9 RATIO (ref 0–1.4)
B2 GLYCOPROT1 IGA SER-ACNC: <9 GPI IGA UNITS (ref 0–25)
B2 GLYCOPROT1 IGG SER-ACNC: <9 GPI IGG UNITS (ref 0–20)
B2 GLYCOPROT1 IGM SER-ACNC: <9 GPI IGM UNITS (ref 0–32)
CONFIRM APTT/NORMAL: 38.3 SEC (ref 0–55)
LA 2 SCREEN W REFLEX-IMP: NORMAL
SCREEN APTT: 34.3 SEC (ref 0–51.9)
SCREEN DRVVT: 31.5 SEC (ref 0–47)
THROMBIN TIME: 17.8 SEC (ref 0–23)

## 2021-03-10 ENCOUNTER — OFFICE VISIT (OUTPATIENT)
Dept: ONCOLOGY | Facility: CLINIC | Age: 56
End: 2021-03-10

## 2021-03-10 ENCOUNTER — LAB (OUTPATIENT)
Dept: LAB | Facility: HOSPITAL | Age: 56
End: 2021-03-10

## 2021-03-10 VITALS
DIASTOLIC BLOOD PRESSURE: 78 MMHG | HEIGHT: 75 IN | WEIGHT: 192.4 LBS | HEART RATE: 55 BPM | RESPIRATION RATE: 16 BRPM | SYSTOLIC BLOOD PRESSURE: 120 MMHG | TEMPERATURE: 98.6 F | BODY MASS INDEX: 23.92 KG/M2 | OXYGEN SATURATION: 98 %

## 2021-03-10 DIAGNOSIS — D68.2 FACTOR V DEFICIENCY (HCC): ICD-10-CM

## 2021-03-10 DIAGNOSIS — I82.4Y2 ACUTE DEEP VEIN THROMBOSIS (DVT) OF PROXIMAL VEIN OF LEFT LOWER EXTREMITY (HCC): ICD-10-CM

## 2021-03-10 DIAGNOSIS — D68.2 FACTOR V DEFICIENCY (HCC): Primary | ICD-10-CM

## 2021-03-10 LAB
BASOPHILS # BLD AUTO: 0.06 10*3/MM3 (ref 0–0.2)
BASOPHILS NFR BLD AUTO: 0.9 % (ref 0–1.5)
DEPRECATED RDW RBC AUTO: 41.6 FL (ref 37–54)
EOSINOPHIL # BLD AUTO: 0.21 10*3/MM3 (ref 0–0.4)
EOSINOPHIL NFR BLD AUTO: 3.1 % (ref 0.3–6.2)
ERYTHROCYTE [DISTWIDTH] IN BLOOD BY AUTOMATED COUNT: 12.1 % (ref 12.3–15.4)
HCT VFR BLD AUTO: 43.2 % (ref 37.5–51)
HGB BLD-MCNC: 14.9 G/DL (ref 13–17.7)
IMM GRANULOCYTES # BLD AUTO: 0.03 10*3/MM3 (ref 0–0.05)
IMM GRANULOCYTES NFR BLD AUTO: 0.4 % (ref 0–0.5)
INR PPP: 3.2 (ref 0.9–1.1)
LYMPHOCYTES # BLD AUTO: 2.66 10*3/MM3 (ref 0.7–3.1)
LYMPHOCYTES NFR BLD AUTO: 39.3 % (ref 19.6–45.3)
MCH RBC QN AUTO: 32 PG (ref 26.6–33)
MCHC RBC AUTO-ENTMCNC: 34.5 G/DL (ref 31.5–35.7)
MCV RBC AUTO: 92.9 FL (ref 79–97)
MONOCYTES # BLD AUTO: 0.61 10*3/MM3 (ref 0.1–0.9)
MONOCYTES NFR BLD AUTO: 9 % (ref 5–12)
NEUTROPHILS NFR BLD AUTO: 3.19 10*3/MM3 (ref 1.7–7)
NEUTROPHILS NFR BLD AUTO: 47.3 % (ref 42.7–76)
NRBC BLD AUTO-RTO: 0.3 /100 WBC (ref 0–0.2)
PLATELET # BLD AUTO: 221 10*3/MM3 (ref 140–450)
PMV BLD AUTO: 9.7 FL (ref 6–12)
PROTHROMBIN TIME: 38.7 SECONDS (ref 11–13.5)
RBC # BLD AUTO: 4.65 10*6/MM3 (ref 4.14–5.8)
WBC # BLD AUTO: 6.76 10*3/MM3 (ref 3.4–10.8)

## 2021-03-10 PROCEDURE — 85025 COMPLETE CBC W/AUTO DIFF WBC: CPT

## 2021-03-10 PROCEDURE — 85610 PROTHROMBIN TIME: CPT

## 2021-03-10 PROCEDURE — 36415 COLL VENOUS BLD VENIPUNCTURE: CPT

## 2021-03-10 PROCEDURE — 99213 OFFICE O/P EST LOW 20 MIN: CPT | Performed by: NURSE PRACTITIONER

## 2021-03-10 RX ORDER — WARFARIN SODIUM 5 MG/1
17.5 TABLET ORAL DAILY
Qty: 350 TABLET | Refills: 3 | Status: SHIPPED | OUTPATIENT
Start: 2021-03-10 | End: 2022-01-03
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
## 2021-03-15 DIAGNOSIS — D68.2 FACTOR V DEFICIENCY (HCC): Primary | ICD-10-CM

## 2021-03-15 DIAGNOSIS — Z79.01 ANTICOAGULANT LONG-TERM USE: ICD-10-CM

## 2021-03-18 ENCOUNTER — LAB (OUTPATIENT)
Dept: LAB | Facility: HOSPITAL | Age: 56
End: 2021-03-18

## 2021-03-18 ENCOUNTER — OFFICE VISIT (OUTPATIENT)
Dept: ONCOLOGY | Facility: CLINIC | Age: 56
End: 2021-03-18

## 2021-03-18 VITALS
TEMPERATURE: 98.2 F | HEART RATE: 78 BPM | OXYGEN SATURATION: 98 % | RESPIRATION RATE: 16 BRPM | SYSTOLIC BLOOD PRESSURE: 128 MMHG | WEIGHT: 192.6 LBS | HEIGHT: 75 IN | BODY MASS INDEX: 23.95 KG/M2 | DIASTOLIC BLOOD PRESSURE: 85 MMHG

## 2021-03-18 DIAGNOSIS — D68.2 FACTOR V DEFICIENCY (HCC): Primary | ICD-10-CM

## 2021-03-18 DIAGNOSIS — I82.4Y2 ACUTE DEEP VEIN THROMBOSIS (DVT) OF PROXIMAL VEIN OF LEFT LOWER EXTREMITY (HCC): ICD-10-CM

## 2021-03-18 DIAGNOSIS — Z79.01 ANTICOAGULANT LONG-TERM USE: ICD-10-CM

## 2021-03-18 DIAGNOSIS — D68.59 HYPERCOAGULABLE STATE (HCC): ICD-10-CM

## 2021-03-18 DIAGNOSIS — D68.2 FACTOR V DEFICIENCY (HCC): ICD-10-CM

## 2021-03-18 DIAGNOSIS — D68.51 FACTOR V LEIDEN MUTATION (HCC): ICD-10-CM

## 2021-03-18 LAB
BASOPHILS # BLD AUTO: 0.04 10*3/MM3 (ref 0–0.2)
BASOPHILS NFR BLD AUTO: 0.3 % (ref 0–1.5)
DEPRECATED RDW RBC AUTO: 45 FL (ref 37–54)
EOSINOPHIL # BLD AUTO: 0.12 10*3/MM3 (ref 0–0.4)
EOSINOPHIL NFR BLD AUTO: 1 % (ref 0.3–6.2)
ERYTHROCYTE [DISTWIDTH] IN BLOOD BY AUTOMATED COUNT: 12.5 % (ref 12.3–15.4)
HCT VFR BLD AUTO: 41.2 % (ref 37.5–51)
HGB BLD-MCNC: 13.6 G/DL (ref 13–17.7)
IMM GRANULOCYTES # BLD AUTO: 0.05 10*3/MM3 (ref 0–0.05)
IMM GRANULOCYTES NFR BLD AUTO: 0.4 % (ref 0–0.5)
INR PPP: 4.51 (ref 0.9–1.1)
LYMPHOCYTES # BLD AUTO: 1.84 10*3/MM3 (ref 0.7–3.1)
LYMPHOCYTES NFR BLD AUTO: 15 % (ref 19.6–45.3)
MCH RBC QN AUTO: 32 PG (ref 26.6–33)
MCHC RBC AUTO-ENTMCNC: 33 G/DL (ref 31.5–35.7)
MCV RBC AUTO: 96.9 FL (ref 79–97)
MONOCYTES # BLD AUTO: 0.97 10*3/MM3 (ref 0.1–0.9)
MONOCYTES NFR BLD AUTO: 7.9 % (ref 5–12)
NEUTROPHILS NFR BLD AUTO: 75.4 % (ref 42.7–76)
NEUTROPHILS NFR BLD AUTO: 9.27 10*3/MM3 (ref 1.7–7)
NRBC BLD AUTO-RTO: 0 /100 WBC (ref 0–0.2)
PLATELET # BLD AUTO: 212 10*3/MM3 (ref 140–450)
PMV BLD AUTO: 9.2 FL (ref 6–12)
PROTHROMBIN TIME: 42.6 SECONDS (ref 11.7–14.2)
RBC # BLD AUTO: 4.25 10*6/MM3 (ref 4.14–5.8)
WBC # BLD AUTO: 12.29 10*3/MM3 (ref 3.4–10.8)

## 2021-03-18 PROCEDURE — 36415 COLL VENOUS BLD VENIPUNCTURE: CPT

## 2021-03-18 PROCEDURE — 85025 COMPLETE CBC W/AUTO DIFF WBC: CPT

## 2021-03-18 PROCEDURE — 85610 PROTHROMBIN TIME: CPT | Performed by: INTERNAL MEDICINE

## 2021-03-18 PROCEDURE — 99213 OFFICE O/P EST LOW 20 MIN: CPT | Performed by: INTERNAL MEDICINE
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
## 2021-03-24 ENCOUNTER — INFUSION (OUTPATIENT)
Dept: ONCOLOGY | Facility: HOSPITAL | Age: 56
End: 2021-03-24

## 2021-03-24 ENCOUNTER — LAB (OUTPATIENT)
Dept: LAB | Facility: HOSPITAL | Age: 56
End: 2021-03-24

## 2021-03-24 DIAGNOSIS — Z79.01 ANTICOAGULANT LONG-TERM USE: ICD-10-CM

## 2021-03-24 DIAGNOSIS — I82.4Y2 ACUTE DEEP VEIN THROMBOSIS (DVT) OF PROXIMAL VEIN OF LEFT LOWER EXTREMITY (HCC): ICD-10-CM

## 2021-03-24 DIAGNOSIS — D68.51 FACTOR V LEIDEN MUTATION (HCC): ICD-10-CM

## 2021-03-24 DIAGNOSIS — D68.59 HYPERCOAGULABLE STATE (HCC): ICD-10-CM

## 2021-03-24 DIAGNOSIS — D68.2 FACTOR V DEFICIENCY (HCC): ICD-10-CM

## 2021-03-24 LAB
INR PPP: 2.2 (ref 0.9–1.1)
PROTHROMBIN TIME: 26.5 SECONDS (ref 11–13.5)

## 2021-03-24 PROCEDURE — G0463 HOSPITAL OUTPT CLINIC VISIT: HCPCS

## 2021-03-24 PROCEDURE — 85610 PROTHROMBIN TIME: CPT

## 2021-03-26 ENCOUNTER — BULK ORDERING (OUTPATIENT)
Dept: CASE MANAGEMENT | Facility: OTHER | Age: 56
End: 2021-03-26

## 2021-03-26 DIAGNOSIS — Z23 IMMUNIZATION DUE: ICD-10-CM

## 2021-04-07 ENCOUNTER — LAB (OUTPATIENT)
Dept: LAB | Facility: HOSPITAL | Age: 56
End: 2021-04-07

## 2021-04-07 ENCOUNTER — CLINICAL SUPPORT (OUTPATIENT)
Dept: ONCOLOGY | Facility: HOSPITAL | Age: 56
End: 2021-04-07

## 2021-04-07 DIAGNOSIS — I82.4Y2 ACUTE DEEP VEIN THROMBOSIS (DVT) OF PROXIMAL VEIN OF LEFT LOWER EXTREMITY (HCC): ICD-10-CM

## 2021-04-07 DIAGNOSIS — D68.59 HYPERCOAGULABLE STATE (HCC): ICD-10-CM

## 2021-04-07 DIAGNOSIS — D68.2 FACTOR V DEFICIENCY (HCC): ICD-10-CM

## 2021-04-07 DIAGNOSIS — Z79.01 ANTICOAGULANT LONG-TERM USE: ICD-10-CM

## 2021-04-07 DIAGNOSIS — D68.51 FACTOR V LEIDEN MUTATION (HCC): ICD-10-CM

## 2021-04-07 LAB
INR PPP: 2.8 (ref 0.9–1.1)
PROTHROMBIN TIME: 34.1 SECONDS (ref 11–13.5)

## 2021-04-07 PROCEDURE — G0463 HOSPITAL OUTPT CLINIC VISIT: HCPCS

## 2021-04-07 PROCEDURE — 85610 PROTHROMBIN TIME: CPT

## 2021-04-07 NOTE — NURSING NOTE
Pt here for INR and RN review. INR 2.8 today, goal 3. Pt confirms previous dosing, denies missed doses, or new medications. INR placed in [facility]. Per [facility] pt will now take 15 mg on Fri and 17.5 mg all other days. Pt v/u. Copy of dosing instructions given to pt and f/u appt reviewed.

## 2021-04-21 ENCOUNTER — LAB (OUTPATIENT)
Dept: LAB | Facility: HOSPITAL | Age: 56
End: 2021-04-21

## 2021-04-21 ENCOUNTER — CLINICAL SUPPORT (OUTPATIENT)
Dept: ONCOLOGY | Facility: HOSPITAL | Age: 56
End: 2021-04-21

## 2021-04-21 DIAGNOSIS — D68.51 FACTOR V LEIDEN MUTATION (HCC): ICD-10-CM

## 2021-04-21 DIAGNOSIS — D68.59 HYPERCOAGULABLE STATE (HCC): ICD-10-CM

## 2021-04-21 DIAGNOSIS — Z79.01 ANTICOAGULANT LONG-TERM USE: ICD-10-CM

## 2021-04-21 DIAGNOSIS — D68.2 FACTOR V DEFICIENCY (HCC): ICD-10-CM

## 2021-04-21 DIAGNOSIS — I82.4Y2 ACUTE DEEP VEIN THROMBOSIS (DVT) OF PROXIMAL VEIN OF LEFT LOWER EXTREMITY (HCC): ICD-10-CM

## 2021-04-21 LAB
INR PPP: 3.1 (ref 0.9–1.1)
PROTHROMBIN TIME: 37.4 SECONDS (ref 11–13.5)

## 2021-04-21 PROCEDURE — G0463 HOSPITAL OUTPT CLINIC VISIT: HCPCS

## 2021-04-21 PROCEDURE — 85610 PROTHROMBIN TIME: CPT

## 2021-04-21 NOTE — NURSING NOTE
Pt here for INR and RN review. INR 3.1 today. Pt confirms previous dosing, denies missed doses, or new medications. INR placed in [facility]. Per [facility] pt will now take 15 mg on Mon/Fri and 17.5 mg AOD. Pt v/u. Copy of dosing instructions given to pt and f/u appt reviewed.

## 2021-05-06 ENCOUNTER — LAB (OUTPATIENT)
Dept: LAB | Facility: HOSPITAL | Age: 56
End: 2021-05-06

## 2021-05-06 ENCOUNTER — OFFICE VISIT (OUTPATIENT)
Dept: ONCOLOGY | Facility: CLINIC | Age: 56
End: 2021-05-06

## 2021-05-06 VITALS
DIASTOLIC BLOOD PRESSURE: 81 MMHG | RESPIRATION RATE: 16 BRPM | TEMPERATURE: 98.2 F | SYSTOLIC BLOOD PRESSURE: 138 MMHG | WEIGHT: 195 LBS | OXYGEN SATURATION: 99 % | HEIGHT: 75 IN | BODY MASS INDEX: 24.25 KG/M2 | HEART RATE: 49 BPM

## 2021-05-06 DIAGNOSIS — D68.59 HYPERCOAGULABLE STATE (HCC): ICD-10-CM

## 2021-05-06 DIAGNOSIS — I82.4Y2 ACUTE DEEP VEIN THROMBOSIS (DVT) OF PROXIMAL VEIN OF LEFT LOWER EXTREMITY (HCC): ICD-10-CM

## 2021-05-06 DIAGNOSIS — D68.51 FACTOR V LEIDEN MUTATION (HCC): ICD-10-CM

## 2021-05-06 DIAGNOSIS — D68.51 FACTOR V LEIDEN MUTATION (HCC): Primary | ICD-10-CM

## 2021-05-06 DIAGNOSIS — Z79.01 ANTICOAGULANT LONG-TERM USE: ICD-10-CM

## 2021-05-06 DIAGNOSIS — D68.2 FACTOR V DEFICIENCY (HCC): ICD-10-CM

## 2021-05-06 LAB
BASOPHILS # BLD AUTO: 0.03 10*3/MM3 (ref 0–0.2)
BASOPHILS NFR BLD AUTO: 0.3 % (ref 0–1.5)
DEPRECATED RDW RBC AUTO: 45.1 FL (ref 37–54)
EOSINOPHIL # BLD AUTO: 0.18 10*3/MM3 (ref 0–0.4)
EOSINOPHIL NFR BLD AUTO: 2.1 % (ref 0.3–6.2)
ERYTHROCYTE [DISTWIDTH] IN BLOOD BY AUTOMATED COUNT: 13.1 % (ref 12.3–15.4)
HCT VFR BLD AUTO: 41.1 % (ref 37.5–51)
HGB BLD-MCNC: 13.7 G/DL (ref 13–17.7)
IMM GRANULOCYTES # BLD AUTO: 0.05 10*3/MM3 (ref 0–0.05)
IMM GRANULOCYTES NFR BLD AUTO: 0.6 % (ref 0–0.5)
INR PPP: 2.8 (ref 0.9–1.1)
LYMPHOCYTES # BLD AUTO: 2.78 10*3/MM3 (ref 0.7–3.1)
LYMPHOCYTES NFR BLD AUTO: 31.8 % (ref 19.6–45.3)
MCH RBC QN AUTO: 31.4 PG (ref 26.6–33)
MCHC RBC AUTO-ENTMCNC: 33.3 G/DL (ref 31.5–35.7)
MCV RBC AUTO: 94.1 FL (ref 79–97)
MONOCYTES # BLD AUTO: 0.91 10*3/MM3 (ref 0.1–0.9)
MONOCYTES NFR BLD AUTO: 10.4 % (ref 5–12)
NEUTROPHILS NFR BLD AUTO: 4.8 10*3/MM3 (ref 1.7–7)
NEUTROPHILS NFR BLD AUTO: 54.8 % (ref 42.7–76)
NRBC BLD AUTO-RTO: 0 /100 WBC (ref 0–0.2)
PLATELET # BLD AUTO: 185 10*3/MM3 (ref 140–450)
PMV BLD AUTO: 9.9 FL (ref 6–12)
PROTHROMBIN TIME: 34 SECONDS (ref 11–13.5)
RBC # BLD AUTO: 4.37 10*6/MM3 (ref 4.14–5.8)
WBC # BLD AUTO: 8.75 10*3/MM3 (ref 3.4–10.8)

## 2021-05-06 PROCEDURE — 36415 COLL VENOUS BLD VENIPUNCTURE: CPT

## 2021-05-06 PROCEDURE — 85610 PROTHROMBIN TIME: CPT

## 2021-05-06 PROCEDURE — 85025 COMPLETE CBC W/AUTO DIFF WBC: CPT

## 2021-05-06 PROCEDURE — 99214 OFFICE O/P EST MOD 30 MIN: CPT | Performed by: INTERNAL MEDICINE
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
## 2021-06-03 ENCOUNTER — LAB (OUTPATIENT)
Dept: LAB | Facility: HOSPITAL | Age: 56
End: 2021-06-03

## 2021-06-03 ENCOUNTER — CLINICAL SUPPORT (OUTPATIENT)
Dept: ONCOLOGY | Facility: HOSPITAL | Age: 56
End: 2021-06-03

## 2021-06-03 DIAGNOSIS — Z79.01 ANTICOAGULANT LONG-TERM USE: ICD-10-CM

## 2021-06-03 DIAGNOSIS — I82.4Y2 ACUTE DEEP VEIN THROMBOSIS (DVT) OF PROXIMAL VEIN OF LEFT LOWER EXTREMITY (HCC): ICD-10-CM

## 2021-06-03 DIAGNOSIS — D68.51 FACTOR V LEIDEN MUTATION (HCC): ICD-10-CM

## 2021-06-03 DIAGNOSIS — D68.59 HYPERCOAGULABLE STATE (HCC): ICD-10-CM

## 2021-06-03 LAB
INR PPP: 3.2 (ref 0.9–1.1)
PROTHROMBIN TIME: 38.1 SECONDS (ref 11–13.5)

## 2021-06-03 PROCEDURE — 85610 PROTHROMBIN TIME: CPT

## 2021-06-03 PROCEDURE — G0463 HOSPITAL OUTPT CLINIC VISIT: HCPCS

## 2021-06-03 NOTE — NURSING NOTE
Pt here for INR with RN review. INR 3.2 today. Pt confirms previous dosing regimen, denies any missed doses new medications, or dietary changes. Per [facility] pt is to start taking 15mg M/W/F, 17.5mg AOD. Copy of schedule given to pt. Pt instructed to contact clinic with any concerns. Pt v/u.

## 2021-07-01 ENCOUNTER — CLINICAL SUPPORT (OUTPATIENT)
Dept: ONCOLOGY | Facility: HOSPITAL | Age: 56
End: 2021-07-01

## 2021-07-01 ENCOUNTER — LAB (OUTPATIENT)
Dept: LAB | Facility: HOSPITAL | Age: 56
End: 2021-07-01

## 2021-07-01 DIAGNOSIS — D68.51 FACTOR V LEIDEN MUTATION (HCC): ICD-10-CM

## 2021-07-01 DIAGNOSIS — I82.4Y2 ACUTE DEEP VEIN THROMBOSIS (DVT) OF PROXIMAL VEIN OF LEFT LOWER EXTREMITY (HCC): ICD-10-CM

## 2021-07-01 DIAGNOSIS — D68.59 HYPERCOAGULABLE STATE (HCC): ICD-10-CM

## 2021-07-01 DIAGNOSIS — D68.2 FACTOR V DEFICIENCY (HCC): ICD-10-CM

## 2021-07-01 DIAGNOSIS — Z79.01 ANTICOAGULANT LONG-TERM USE: ICD-10-CM

## 2021-07-01 LAB
INR PPP: 3 (ref 0.9–1.1)
PROTHROMBIN TIME: 36.4 SECONDS (ref 11–13.5)

## 2021-07-01 PROCEDURE — 85610 PROTHROMBIN TIME: CPT

## 2021-07-01 PROCEDURE — G0463 HOSPITAL OUTPT CLINIC VISIT: HCPCS

## 2021-09-03 ENCOUNTER — LAB (OUTPATIENT)
Dept: LAB | Facility: HOSPITAL | Age: 56
End: 2021-09-03

## 2021-09-03 ENCOUNTER — CLINICAL SUPPORT (OUTPATIENT)
Dept: ONCOLOGY | Facility: HOSPITAL | Age: 56
End: 2021-09-03

## 2021-09-03 DIAGNOSIS — Z79.01 ANTICOAGULANT LONG-TERM USE: ICD-10-CM

## 2021-09-03 DIAGNOSIS — I82.4Y2 ACUTE DEEP VEIN THROMBOSIS (DVT) OF PROXIMAL VEIN OF LEFT LOWER EXTREMITY (HCC): ICD-10-CM

## 2021-09-03 DIAGNOSIS — D68.51 FACTOR V LEIDEN MUTATION (HCC): ICD-10-CM

## 2021-09-03 DIAGNOSIS — D68.59 HYPERCOAGULABLE STATE (HCC): ICD-10-CM

## 2021-09-03 LAB
INR PPP: 3 (ref 0.9–1.1)
PROTHROMBIN TIME: 36.3 SECONDS (ref 11–13.5)

## 2021-09-03 PROCEDURE — 36416 COLLJ CAPILLARY BLOOD SPEC: CPT

## 2021-09-03 PROCEDURE — 85610 PROTHROMBIN TIME: CPT

## 2021-09-03 NOTE — NURSING NOTE
Pt here for INR review. Did not stay for results. His INR was 3 so his dose will not change. RN talked to pt at [facility] and he was doing fine with no concerns or changes.

## 2021-10-01 ENCOUNTER — APPOINTMENT (OUTPATIENT)
Dept: ONCOLOGY | Facility: HOSPITAL | Age: 56
End: 2021-10-01

## 2021-10-01 ENCOUNTER — APPOINTMENT (OUTPATIENT)
Dept: LAB | Facility: HOSPITAL | Age: 56
End: 2021-10-01

## 2021-10-04 ENCOUNTER — CLINICAL SUPPORT (OUTPATIENT)
Dept: ONCOLOGY | Facility: HOSPITAL | Age: 56
End: 2021-10-04

## 2021-10-04 ENCOUNTER — LAB (OUTPATIENT)
Dept: LAB | Facility: HOSPITAL | Age: 56
End: 2021-10-04

## 2021-10-04 DIAGNOSIS — D68.51 FACTOR V LEIDEN MUTATION (HCC): ICD-10-CM

## 2021-10-04 DIAGNOSIS — D68.59 HYPERCOAGULABLE STATE (HCC): ICD-10-CM

## 2021-10-04 DIAGNOSIS — I82.4Y2 ACUTE DEEP VEIN THROMBOSIS (DVT) OF PROXIMAL VEIN OF LEFT LOWER EXTREMITY (HCC): ICD-10-CM

## 2021-10-04 DIAGNOSIS — Z79.01 ANTICOAGULANT LONG-TERM USE: ICD-10-CM

## 2021-10-04 LAB
INR PPP: 4 (ref 0.9–1.1)
PROTHROMBIN TIME: 47.8 SECONDS (ref 11–13.5)

## 2021-10-04 PROCEDURE — 85610 PROTHROMBIN TIME: CPT

## 2021-10-04 PROCEDURE — G0463 HOSPITAL OUTPT CLINIC VISIT: HCPCS

## 2021-10-04 NOTE — NURSING NOTE
Pt here for lab with RN review. INR 4.0 today with a goal of 3.0. Pt confirms previous dose of 15mg M/W/F and 17.5mg AOD. Pt denies missed doses, new medications or dietary changes. S/w [facility] Arreola, pt will now take 15mg Sun/Thurs and 17.5mg AOD. Pt will recheck in one month. Pt v/u.

## 2021-11-04 ENCOUNTER — LAB (OUTPATIENT)
Dept: LAB | Facility: HOSPITAL | Age: 56
End: 2021-11-04

## 2021-11-04 ENCOUNTER — OFFICE VISIT (OUTPATIENT)
Dept: ONCOLOGY | Facility: CLINIC | Age: 56
End: 2021-11-04

## 2021-11-04 VITALS
WEIGHT: 193.2 LBS | DIASTOLIC BLOOD PRESSURE: 87 MMHG | HEIGHT: 74 IN | RESPIRATION RATE: 14 BRPM | BODY MASS INDEX: 24.79 KG/M2 | OXYGEN SATURATION: 98 % | SYSTOLIC BLOOD PRESSURE: 135 MMHG | HEART RATE: 52 BPM | TEMPERATURE: 97.5 F

## 2021-11-04 DIAGNOSIS — I82.4Y2 ACUTE DEEP VEIN THROMBOSIS (DVT) OF PROXIMAL VEIN OF LEFT LOWER EXTREMITY (HCC): ICD-10-CM

## 2021-11-04 DIAGNOSIS — D68.59 HYPERCOAGULABLE STATE (HCC): ICD-10-CM

## 2021-11-04 DIAGNOSIS — I82.432 ACUTE DEEP VEIN THROMBOSIS (DVT) OF POPLITEAL VEIN OF LEFT LOWER EXTREMITY (HCC): ICD-10-CM

## 2021-11-04 DIAGNOSIS — D68.51 FACTOR V LEIDEN MUTATION (HCC): ICD-10-CM

## 2021-11-04 DIAGNOSIS — Z79.01 ANTICOAGULANT LONG-TERM USE: Primary | ICD-10-CM

## 2021-11-04 DIAGNOSIS — Z79.01 ANTICOAGULANT LONG-TERM USE: ICD-10-CM

## 2021-11-04 LAB
INR PPP: 2.6 (ref 0.9–1.1)
PROTHROMBIN TIME: 31.2 SECONDS (ref 11–13.5)

## 2021-11-04 PROCEDURE — 85610 PROTHROMBIN TIME: CPT

## 2021-11-04 PROCEDURE — 99213 OFFICE O/P EST LOW 20 MIN: CPT | Performed by: INTERNAL MEDICINE
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
## 2021-12-03 ENCOUNTER — TELEPHONE (OUTPATIENT)
Dept: ONCOLOGY | Facility: CLINIC | Age: 56
End: 2021-12-03

## 2021-12-06 ENCOUNTER — TELEPHONE (OUTPATIENT)
Dept: ONCOLOGY | Facility: CLINIC | Age: 56
End: 2021-12-06

## 2021-12-06 NOTE — TELEPHONE ENCOUNTER
Caller: Blayne Grijalva    Relationship: Self    Best call back number: [phone]    What is the best time to reach you:  AFTERNOONS 3PM OR LATER PREFERRED    Who are you requesting to speak with (clinical staff, provider,  specific staff member): CLINICAL      What was the call regarding:PATIENT REQUESTING CLARIFICATION ON HOW HE SHOULD BE TESTING INR. CURRENTLY ORDERED FOR AT HOME AND IN OFFICE.    Do you require a callback: YES

## 2022-01-03 ENCOUNTER — TELEPHONE (OUTPATIENT)
Dept: ONCOLOGY | Facility: HOSPITAL | Age: 57
End: 2022-01-03

## 2022-01-03 LAB
EXTERNAL INR: 4.3
EXTERNAL RESULTING LABORATORY: NORMAL

## 2022-01-03 RX ORDER — WARFARIN SODIUM 5 MG/1
TABLET ORAL
Qty: 315 TABLET | Refills: 1 | Status: SHIPPED | OUTPATIENT
Start: 2022-01-03 | End: 2022-05-31

## 2022-01-03 NOTE — TELEPHONE ENCOUNTER
Called pt to review INR results. INR 4.3. Confirmed no missed doses or medication changes. According to Dr. Shin's last note on 11/4/21 pt was supposed to be taking 15 mg m/w/f and 17.5 mg AOD but pt stated he has not been taking his coumadin that way and that explains why his INR was high. Pt is currently taking 15 mg thurs/fri and 17.5 mg AOD. I entered this into [facility] for new dosing. Per [facility] pt is to now take 17.5 mg sun/thurs and 15 mg AOD. Pt repeated back new dosing and v/u.

## 2022-03-02 DIAGNOSIS — Z12.11 SCREENING FOR COLON CANCER: Primary | ICD-10-CM

## 2022-03-04 ENCOUNTER — TELEPHONE (OUTPATIENT)
Dept: ONCOLOGY | Facility: HOSPITAL | Age: 57
End: 2022-03-04

## 2022-03-04 LAB
EXTERNAL INR: 2.1
EXTERNAL RESULTING LABORATORY: NORMAL

## 2022-03-22 ENCOUNTER — TELEPHONE (OUTPATIENT)
Dept: ONCOLOGY | Facility: HOSPITAL | Age: 57
End: 2022-03-22

## 2022-03-22 LAB
EXTERNAL INR: 3
EXTERNAL RESULTING LABORATORY: NORMAL

## 2022-03-22 NOTE — TELEPHONE ENCOUNTER
Called pt to review INR. See flowsheet.    Lab Results   Component Value Date    INR 3.0 03/22/2022    INR 2.1 03/02/2022    INR 4.3 01/03/2022    PROTIME 31.2 (H) 11/04/2021    PROTIME 47.8 (H) 10/04/2021    PROTIME 36.3 (H) 09/03/2021

## 2022-04-28 ENCOUNTER — APPOINTMENT (OUTPATIENT)
Dept: LAB | Facility: HOSPITAL | Age: 57
End: 2022-04-28

## 2022-05-11 ENCOUNTER — TELEPHONE (OUTPATIENT)
Dept: ONCOLOGY | Facility: HOSPITAL | Age: 57
End: 2022-05-11

## 2022-05-11 NOTE — TELEPHONE ENCOUNTER
Pt called to review coag from 5/6. Pt reports he never got a phone call about his INR results. INR of 2.7 entered into the pts chart. Goal is 2.5-3.5. Pt reports taking 15 Sun/T/Th and 17.5 aod. Coag suggest pt take 15 Sunday and 17.5 aod. Pt was therapeutic last INR check taking his current dose. Pt agrees to take 15 Sun/Th and 17.5 aod. Pt will recheck in a week.    Lab Results   Component Value Date    INR 3.0 03/22/2022    INR 2.1 03/02/2022    INR 4.3 01/03/2022    PROTIME 31.2 (H) 11/04/2021    PROTIME 47.8 (H) 10/04/2021    PROTIME 36.3 (H) 09/03/2021

## 2022-05-31 RX ORDER — WARFARIN SODIUM 5 MG/1
TABLET ORAL
Qty: 315 TABLET | Refills: 0 | Status: SHIPPED | OUTPATIENT
Start: 2022-05-31 | End: 2022-10-26

## 2022-06-23 ENCOUNTER — TELEPHONE (OUTPATIENT)
Dept: ONCOLOGY | Facility: HOSPITAL | Age: 57
End: 2022-06-23

## 2022-06-23 LAB
EXTERNAL INR: 2.4
EXTERNAL RESULTING LABORATORY: NORMAL

## 2022-06-23 NOTE — TELEPHONE ENCOUNTER
Called pt to review coag.     Lab Results   Component Value Date    INR 2.4 06/23/2022    INR 3.0 03/22/2022    INR 2.1 03/02/2022    PROTIME 31.2 (H) 11/04/2021    PROTIME 47.8 (H) 10/04/2021    PROTIME 36.3 (H) 09/03/2021

## 2022-06-29 ENCOUNTER — OFFICE VISIT (OUTPATIENT)
Dept: SLEEP MEDICINE | Facility: HOSPITAL | Age: 57
End: 2022-06-29

## 2022-06-29 VITALS
HEIGHT: 74 IN | BODY MASS INDEX: 24.79 KG/M2 | OXYGEN SATURATION: 98 % | DIASTOLIC BLOOD PRESSURE: 79 MMHG | HEART RATE: 69 BPM | WEIGHT: 193.2 LBS | SYSTOLIC BLOOD PRESSURE: 127 MMHG

## 2022-06-29 DIAGNOSIS — G47.10 HYPERSOMNIA: ICD-10-CM

## 2022-06-29 DIAGNOSIS — R06.83 SNORING: ICD-10-CM

## 2022-06-29 DIAGNOSIS — G47.8 NON-RESTORATIVE SLEEP: ICD-10-CM

## 2022-06-29 DIAGNOSIS — G47.30 SLEEP APNEA, UNSPECIFIED TYPE: Primary | ICD-10-CM

## 2022-06-29 PROCEDURE — 99204 OFFICE O/P NEW MOD 45 MIN: CPT | Performed by: FAMILY MEDICINE

## 2022-06-29 PROCEDURE — G0463 HOSPITAL OUTPT CLINIC VISIT: HCPCS

## 2022-06-29 NOTE — PROGRESS NOTES
"[facility] New Patient/Consultation       Reason for Consultation: Sleep disturbances      Patient Care Team:  Blayne Cast MD as PCP - General (Family Medicine)  Lenny Shin MD as Consulting Physician (Hematology and Oncology)  Mariano Sanchez MD as Referring Physician (Family Medicine)  Homero Mccall MD as Consulting Physician (Sleep Medicine)      History of present illness:  Thank you for asking me to see your patient.  The patient is a 56 y.o. male with factor V Leyden deficiency presents today with concern for sleep disorder.  No history of prior sleep study or tonsillectomy.  Patient reports snoring waking up coughing/choking.  No family history of sleep apnea.  Some hypersomnia/nonrestorative sleep.    Bedtime 9 PM to 10 PM sleep latency 1 to 2 minutes wake time 5:30 AM to 6 AM sleep 7.5 hours 1 nap on the weekends.  No rotating shifts.    Social History: No tobacco use 1 alcoholic drink per day 3 caffeine beverages a day    Allergies:  Patient has no known allergies.    Family History: JOAO no       Current Outpatient Medications:   •  Multiple Vitamins-Minerals (MULTIVITAMIN ADULT PO), Take  by mouth Daily., Disp: , Rfl:   •  Omega-3 Fatty Acids (FISH OIL) 1000 MG capsule capsule, Take  by mouth Daily With Breakfast., Disp: , Rfl:   •  warfarin (COUMADIN) 5 MG tablet, TAKE 3 AND 1/2 TABLETS DAILY FOR DEEP VEIN THROMBOSIS / PULMONARY EMBOLISM (ACTIVE THROMBOSIS)., Disp: 315 tablet, Rfl: 0    Vital Signs:    Vitals:    06/29/22 1432   BP: 127/79   Pulse: 69   SpO2: 98%   Weight: 87.6 kg (193 lb 3.2 oz)   Height: 188 cm (74\")      Body mass index is 24.81 kg/m².  Neck Circumference: 14.75 inches      REVIEW OF SYSTEMS.  Full review of systems available on the intake form which is scanned in the media tab.  The relevant positive are noted below  1. Daytime excessive sleepiness with Horatio Sleepiness Scale :Total score: 4   2. Snoring  3. All negative      Physical " "exam:  Vitals:    06/29/22 1432   BP: 127/79   Pulse: 69   SpO2: 98%   Weight: 87.6 kg (193 lb 3.2 oz)   Height: 188 cm (74\")    Body mass index is 24.81 kg/m². Neck Circumference: 14.75 inches  HEENT: Head is atraumatic, normocephalic  Eyes: pupils are round equal and reacting to light and accommodation, conjunctiva normal  NECK:Neck Circumference: 14.75 inches  RESPIRATORY SYSTEM: Regular respirations  CARDIOVASULAR SYSTEM: Regular rate  EXTREMITES: No cyanosis, clubbing  NEUROLOGICAL SYSTEM: Oriented x 3, no gross motor defects, gait normal      Impression:  1. Sleep apnea, unspecified type    2. Hypersomnia    3. Non-restorative sleep    4. Snoring        Plan:    Good sleep hygiene measures should be maintained.      I discussed the pathophysiology of obstructive sleep apnea with the patient.  We discussed the adverse outcomes associated with untreated sleep-disordered breathing.  We discussed treatment modalities of obstructive sleep apnea including CPAP device as well as oral mandibular advancement device. Sleep study will be scheduled to establish definitive diagnosis of sleep disorder breathing.  Weight loss will be strongly beneficial in order to reduce the severity of sleep-disordered breathing.  Caution during activities that require prolonged concentration is strongly advised.  Patient will be notified of sleep study results after sleep study is completed.  If sleep apnea is only mild,  oral mandibular advancement device may be one of the treatment options.  However if sleep apnea is moderately severe, CPAP treatment will be strongly encouraged.  The patient is not opposed to treatment with CPAP device if we confirm significant obstructive sleep apnea on polysomnography.     If mild prefers [facility].    Thank you for allowing me to participate in your patient's care.    Homero Mccall MD  Sleep Medicine  06/29/22  14:57 EDT      "

## 2022-07-05 ENCOUNTER — TELEPHONE (OUTPATIENT)
Dept: ONCOLOGY | Facility: HOSPITAL | Age: 57
End: 2022-07-05

## 2022-07-05 LAB
EXTERNAL INR: 2.9
EXTERNAL RESULTING LABORATORY: NORMAL

## 2022-07-05 NOTE — TELEPHONE ENCOUNTER
Attempted to call pt with coag. No answer left detailed message.    Lab Results   Component Value Date    INR 2.9 07/05/2022    INR 2.4 06/23/2022    INR 3.0 03/22/2022    PROTIME 31.2 (H) 11/04/2021    PROTIME 47.8 (H) 10/04/2021    PROTIME 36.3 (H) 09/03/2021

## 2022-08-30 ENCOUNTER — TELEPHONE (OUTPATIENT)
Dept: SLEEP MEDICINE | Facility: HOSPITAL | Age: 57
End: 2022-08-30

## 2022-09-06 ENCOUNTER — TELEPHONE (OUTPATIENT)
Dept: ONCOLOGY | Facility: HOSPITAL | Age: 57
End: 2022-09-06

## 2022-09-06 ENCOUNTER — TELEPHONE (OUTPATIENT)
Dept: SLEEP MEDICINE | Facility: HOSPITAL | Age: 57
End: 2022-09-06

## 2022-09-06 LAB
EXTERNAL INR: 3
EXTERNAL RESULTING LABORATORY: NORMAL

## 2022-09-06 NOTE — TELEPHONE ENCOUNTER
Called pt to review coag.    Lab Results   Component Value Date    INR 3.0 09/06/2022    INR 2.9 07/05/2022    INR 2.4 06/23/2022    PROTIME 31.2 (H) 11/04/2021    PROTIME 47.8 (H) 10/04/2021    PROTIME 36.3 (H) 09/03/2021

## 2022-09-15 ENCOUNTER — HOSPITAL ENCOUNTER (OUTPATIENT)
Dept: SLEEP MEDICINE | Facility: HOSPITAL | Age: 57
Discharge: HOME | End: 2022-09-15
Admitting: FAMILY MEDICINE

## 2022-09-15 PROCEDURE — 95806 SLEEP STUDY UNATT&RESP EFFT: CPT | Performed by: FAMILY MEDICINE

## 2022-09-15 PROCEDURE — 95806 SLEEP STUDY UNATT&RESP EFFT: CPT

## 2022-10-06 ENCOUNTER — TELEPHONE (OUTPATIENT)
Dept: SLEEP MEDICINE | Facility: HOSPITAL | Age: 57
End: 2022-10-06

## 2022-10-06 NOTE — TELEPHONE ENCOUNTER
LV requesting patient to call if he has questions about sleep study results , Neg for JOAO , follow up with primary care

## 2022-10-25 ENCOUNTER — TELEPHONE (OUTPATIENT)
Dept: ONCOLOGY | Facility: HOSPITAL | Age: 57
End: 2022-10-25

## 2022-10-25 LAB
EXTERNAL INR: 2.3
EXTERNAL RESULTING LABORATORY: NORMAL

## 2022-10-25 NOTE — TELEPHONE ENCOUNTER
Attempted to return pt's call, no answer. Per the note on his test result, he missed one 17.5 mg dose of Coumadin. Current INR is 2.3. Range is 2.5 to 3.5. Left message to resume his normal dose and recheck as scheduled. Advised to call with questions or concerns.

## 2022-10-26 RX ORDER — WARFARIN SODIUM 5 MG/1
TABLET ORAL
Qty: 315 TABLET | Refills: 0 | Status: SHIPPED | OUTPATIENT
Start: 2022-10-26 | End: 2023-01-11

## 2022-12-21 ENCOUNTER — TELEPHONE (OUTPATIENT)
Dept: ONCOLOGY | Facility: HOSPITAL | Age: 57
End: 2022-12-21

## 2022-12-21 LAB
EXTERNAL INR: 3.7 (ref 2.5–3.5)
EXTERNAL RESULTING LABORATORY: ABNORMAL

## 2022-12-21 NOTE — TELEPHONE ENCOUNTER
Home INR received, 3.7 (goal 2.5-3.5)  Attempted to call to review with pt, no answer, left message to call our office.  ACH encounter held for review

## 2022-12-22 ENCOUNTER — TELEPHONE (OUTPATIENT)
Dept: ONCOLOGY | Facility: HOSPITAL | Age: 57
End: 2022-12-22

## 2022-12-22 NOTE — TELEPHONE ENCOUNTER
Received INR via [facility]. Called pt to discuss. His INR was 3.7. No missed doses or changes. Per [facility], we will decrease his dose to 15mg on MWF and 17.5mg all other days. Pt v/u.

## 2023-01-11 RX ORDER — WARFARIN SODIUM 5 MG/1
TABLET ORAL
Qty: 315 TABLET | Refills: 0 | Status: SHIPPED | OUTPATIENT
Start: 2023-01-11

## 2023-02-24 ENCOUNTER — TELEPHONE (OUTPATIENT)
Dept: ONCOLOGY | Facility: HOSPITAL | Age: 58
End: 2023-02-24
Payer: COMMERCIAL

## 2023-02-24 LAB
EXTERNAL INR: 2
EXTERNAL RESULTING LABORATORY: NORMAL

## 2023-02-24 NOTE — TELEPHONE ENCOUNTER
Called pt to discuss INR, no answer, left message to call back.       Pt returned call. INR 2.0 which is low for him. He does not report any missed doses or changes. Per [facility], pt will increase dose to 17.5mg on MWF and 20mg all other days. He will recheck in 2 weeks to make sure his number has not gone too high. R/V.

## 2023-02-27 DIAGNOSIS — I82.432 ACUTE DEEP VEIN THROMBOSIS (DVT) OF POPLITEAL VEIN OF LEFT LOWER EXTREMITY: ICD-10-CM

## 2023-02-27 DIAGNOSIS — D68.51 FACTOR V LEIDEN MUTATION: Primary | ICD-10-CM

## 2023-02-27 DIAGNOSIS — Z79.01 ANTICOAGULANT LONG-TERM USE: ICD-10-CM

## 2023-03-21 LAB — INR PPP: 2.3

## 2023-03-28 RX ORDER — WARFARIN SODIUM 5 MG/1
TABLET ORAL
Qty: 315 TABLET | Refills: 0 | OUTPATIENT
Start: 2023-03-28

## 2023-03-29 ENCOUNTER — ANTICOAGULATION VISIT (OUTPATIENT)
Dept: PHARMACY | Facility: HOSPITAL | Age: 58
End: 2023-03-29
Payer: COMMERCIAL

## 2023-03-29 DIAGNOSIS — D68.51 FACTOR V LEIDEN MUTATION: Primary | ICD-10-CM

## 2023-03-29 DIAGNOSIS — I82.432 ACUTE DEEP VEIN THROMBOSIS (DVT) OF POPLITEAL VEIN OF LEFT LOWER EXTREMITY: ICD-10-CM

## 2023-03-29 DIAGNOSIS — Z79.01 ANTICOAGULANT LONG-TERM USE: ICD-10-CM

## 2023-03-30 ENCOUNTER — TELEPHONE (OUTPATIENT)
Dept: ONCOLOGY | Facility: CLINIC | Age: 58
End: 2023-03-30
Payer: COMMERCIAL

## 2023-03-30 NOTE — TELEPHONE ENCOUNTER
LEFT MSG ON PT VM - PT CX April APPT AND HAS NOT RESCHEDULED - PT NEEDS TO R/S HIS APPT SO THEY CAN REFILL HIS COUMADIN - NEEDS LAB, MD/NP OR ANTICOAG LAB APPT

## 2023-03-30 NOTE — PROGRESS NOTES
Anticoagulation Clinic Progress Note    Patient's visit was held via telephone today. Patient agreed to pharmacist care per [facility].      Anticoagulation Summary  As of 3/29/2023    INR goal:  2.5-3.5   TTR:  --   INR used for dosin.30 (3/21/2023)   Warfarin maintenance plan:  20 mg (5 mg x 4) every Mon, Wed, Fri; 17.5 mg (5 mg x 3.5) all other days; Starting 3/29/2023   Weekly warfarin total:  130 mg   Plan last modified:  Karen Nash RPH (3/30/2023)   Next INR check:  2023   Target end date:  Indefinite    Indications    Factor V Leiden mutation (HCC) [D68.51]  Anticoagulant long-term use [Z79.01]  Acute deep vein thrombosis (DVT) of popliteal vein of left lower extremity (HCC) [I82.432]             Anticoagulation Episode Summary     INR check location:      Preferred lab:      Send INR reminders to:  [facility] CBC ANTICOAG POOL    Comments:  [facility]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, Doac failure      Anticoagulation Care Providers     Provider Role Specialty Phone number    Lenny Shin MD Referring Hematology and Oncology [phone]          Drug interactions: has remained unchanged.  Diet: has remained unchanged.    Clinic Interview:  No pertinent clinical findings have been reported.    INR History:  Anticoagulation Monitoring 3/29/2023   INR 2.30   INR Date 3/21/2023   INR Goal 2.5-3.5   Last Week Total 55 mg   Next Week Total 130 mg   Sun 17.5 mg   Mon 20 mg   Tue 17.5 mg   Wed 20 mg   Thu 17.5 mg   Fri 20 mg   Sat 17.5 mg   Visit Report -       Plan:  1. INR is Subtherapeutic but improved on 3/21/23 after dosage change in Feb-- see above in Anticoagulation Summary.   Blayne Grijalva to Continue their warfarin regimen since he is due for another INR next week-- see above in Anticoagulation Summary.  2. Follow up in 1 week  3.They have been instructed to call if any changes in medications, doses, concerns, etc. Patient expresses understanding and has no further questions at this time.    Karen Nash RPH

## 2023-04-03 ENCOUNTER — TELEPHONE (OUTPATIENT)
Dept: ONCOLOGY | Facility: CLINIC | Age: 58
End: 2023-04-03
Payer: COMMERCIAL

## 2023-04-03 NOTE — TELEPHONE ENCOUNTER
SPOKE WITH PATIENT AND HE HAS CONFIRMED HIS APPT DATE AND TIME AT THE [facility] OFFICE

## 2023-04-03 NOTE — TELEPHONE ENCOUNTER
Caller: Blayne Grijalva    Relationship to patient: Self    Best call back number: [phone]    Type of visit: LAB & F/U 1   Requested date: CALL TO R/S WANTS [facility] LOCATION    If rescheduling, when is the original appointment: 4/6/2023

## 2023-04-06 DIAGNOSIS — D68.51 FACTOR V LEIDEN MUTATION: ICD-10-CM

## 2023-04-06 DIAGNOSIS — D68.59 HYPERCOAGULABLE STATE: Primary | ICD-10-CM

## 2023-04-06 DIAGNOSIS — Z79.01 ANTICOAGULANT LONG-TERM USE: ICD-10-CM

## 2023-04-11 ENCOUNTER — LAB (OUTPATIENT)
Dept: LAB | Facility: HOSPITAL | Age: 58
End: 2023-04-11
Payer: COMMERCIAL

## 2023-04-11 ENCOUNTER — OFFICE VISIT (OUTPATIENT)
Dept: ONCOLOGY | Facility: CLINIC | Age: 58
End: 2023-04-11
Payer: COMMERCIAL

## 2023-04-11 ENCOUNTER — ANTICOAGULATION VISIT (OUTPATIENT)
Dept: ONCOLOGY | Facility: HOSPITAL | Age: 58
End: 2023-04-11
Payer: COMMERCIAL

## 2023-04-11 VITALS
TEMPERATURE: 97.1 F | HEART RATE: 65 BPM | HEIGHT: 74 IN | SYSTOLIC BLOOD PRESSURE: 124 MMHG | OXYGEN SATURATION: 98 % | BODY MASS INDEX: 26.31 KG/M2 | DIASTOLIC BLOOD PRESSURE: 80 MMHG | WEIGHT: 205 LBS

## 2023-04-11 DIAGNOSIS — Z79.01 ANTICOAGULANT LONG-TERM USE: ICD-10-CM

## 2023-04-11 DIAGNOSIS — D68.59 HYPERCOAGULABLE STATE: Primary | ICD-10-CM

## 2023-04-11 DIAGNOSIS — D68.51 FACTOR V LEIDEN MUTATION: Primary | ICD-10-CM

## 2023-04-11 DIAGNOSIS — D68.59 HYPERCOAGULABLE STATE: ICD-10-CM

## 2023-04-11 DIAGNOSIS — D68.51 FACTOR V LEIDEN MUTATION: ICD-10-CM

## 2023-04-11 DIAGNOSIS — I82.432 ACUTE DEEP VEIN THROMBOSIS (DVT) OF POPLITEAL VEIN OF LEFT LOWER EXTREMITY: ICD-10-CM

## 2023-04-11 LAB
ALBUMIN SERPL-MCNC: 4.2 G/DL (ref 3.5–5.2)
ALBUMIN/GLOB SERPL: 1.4 G/DL (ref 1.1–2.4)
ALP SERPL-CCNC: 55 U/L (ref 38–116)
ALT SERPL W P-5'-P-CCNC: 25 U/L (ref 0–41)
ANION GAP SERPL CALCULATED.3IONS-SCNC: 7.4 MMOL/L (ref 5–15)
AST SERPL-CCNC: 36 U/L (ref 0–40)
BASOPHILS # BLD AUTO: 0.05 10*3/MM3 (ref 0–0.2)
BASOPHILS NFR BLD AUTO: 0.6 % (ref 0–1.5)
BILIRUB SERPL-MCNC: 0.5 MG/DL (ref 0.2–1.2)
BUN SERPL-MCNC: 15 MG/DL (ref 6–20)
BUN/CREAT SERPL: 15.5 (ref 7.3–30)
CALCIUM SPEC-SCNC: 9.5 MG/DL (ref 8.5–10.2)
CHLORIDE SERPL-SCNC: 102 MMOL/L (ref 98–107)
CO2 SERPL-SCNC: 29.6 MMOL/L (ref 22–29)
CREAT SERPL-MCNC: 0.97 MG/DL (ref 0.7–1.3)
DEPRECATED RDW RBC AUTO: 44.4 FL (ref 37–54)
EGFRCR SERPLBLD CKD-EPI 2021: 91.1 ML/MIN/1.73
EOSINOPHIL # BLD AUTO: 0.16 10*3/MM3 (ref 0–0.4)
EOSINOPHIL NFR BLD AUTO: 2 % (ref 0.3–6.2)
ERYTHROCYTE [DISTWIDTH] IN BLOOD BY AUTOMATED COUNT: 12.7 % (ref 12.3–15.4)
GLOBULIN UR ELPH-MCNC: 2.9 GM/DL (ref 1.8–3.5)
GLUCOSE SERPL-MCNC: 93 MG/DL (ref 74–124)
HCT VFR BLD AUTO: 41.4 % (ref 37.5–51)
HGB BLD-MCNC: 13.8 G/DL (ref 13–17.7)
IMM GRANULOCYTES # BLD AUTO: 0.01 10*3/MM3 (ref 0–0.05)
IMM GRANULOCYTES NFR BLD AUTO: 0.1 % (ref 0–0.5)
INR PPP: 3.1 (ref 0.9–1.1)
LYMPHOCYTES # BLD AUTO: 2.18 10*3/MM3 (ref 0.7–3.1)
LYMPHOCYTES NFR BLD AUTO: 26.6 % (ref 19.6–45.3)
MCH RBC QN AUTO: 31.7 PG (ref 26.6–33)
MCHC RBC AUTO-ENTMCNC: 33.3 G/DL (ref 31.5–35.7)
MCV RBC AUTO: 95.2 FL (ref 79–97)
MONOCYTES # BLD AUTO: 0.77 10*3/MM3 (ref 0.1–0.9)
MONOCYTES NFR BLD AUTO: 9.4 % (ref 5–12)
NEUTROPHILS NFR BLD AUTO: 5.02 10*3/MM3 (ref 1.7–7)
NEUTROPHILS NFR BLD AUTO: 61.3 % (ref 42.7–76)
NRBC BLD AUTO-RTO: 0 /100 WBC (ref 0–0.2)
PLATELET # BLD AUTO: 210 10*3/MM3 (ref 140–450)
PMV BLD AUTO: 8.9 FL (ref 6–12)
POTASSIUM SERPL-SCNC: 4.3 MMOL/L (ref 3.5–4.7)
PROT SERPL-MCNC: 7.1 G/DL (ref 6.3–8)
PROTHROMBIN TIME: 37.4 SECONDS (ref 11–13.5)
RBC # BLD AUTO: 4.35 10*6/MM3 (ref 4.14–5.8)
SODIUM SERPL-SCNC: 139 MMOL/L (ref 134–145)
WBC NRBC COR # BLD: 8.19 10*3/MM3 (ref 3.4–10.8)

## 2023-04-11 PROCEDURE — 85610 PROTHROMBIN TIME: CPT

## 2023-04-11 PROCEDURE — 36415 COLL VENOUS BLD VENIPUNCTURE: CPT

## 2023-04-11 PROCEDURE — G0463 HOSPITAL OUTPT CLINIC VISIT: HCPCS

## 2023-04-11 PROCEDURE — 80053 COMPREHEN METABOLIC PANEL: CPT

## 2023-04-11 PROCEDURE — 99213 OFFICE O/P EST LOW 20 MIN: CPT | Performed by: NURSE PRACTITIONER

## 2023-04-11 PROCEDURE — 85025 COMPLETE CBC W/AUTO DIFF WBC: CPT

## 2023-04-11 RX ORDER — WARFARIN SODIUM 5 MG/1
5 TABLET ORAL NIGHTLY
Qty: 315 TABLET | Refills: 3 | Status: SHIPPED | OUTPATIENT
Start: 2023-04-11
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
## 2023-04-11 NOTE — PROGRESS NOTES
Anticoagulation Clinic Progress Note    Patient's visit was held in office today.    Anticoagulation Summary  As of 4/11/2023    INR goal:  2.5-3.5   TTR:  100.0 % (1.9 wk)   INR used for dosing:  3.10 (4/11/2023)   Warfarin maintenance plan:  20 mg (5 mg x 4) every Mon, Wed, Fri; 17.5 mg (5 mg x 3.5) all other days; Starting 4/11/2023   Weekly warfarin total:  130 mg   No change documented:  Karen Nash RPH   Plan last modified:  Karen Nash RPH (3/30/2023)   Next INR check:  4/25/2023   Target end date:  Indefinite    Indications    Factor V Leiden mutation [D68.51]  Anticoagulant long-term use [Z79.01]  Acute deep vein thrombosis (DVT) of popliteal vein of left lower extremity [I82.432]             Anticoagulation Episode Summary     INR check location:      Preferred lab:      Send INR reminders to:  [facility] CBC ANTICOAG POOL    Comments:  [facility]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, Doac failure      Anticoagulation Care Providers     Provider Role Specialty Phone number    Lenny Shin MD Referring Hematology and Oncology [phone]          Clinic Interview:      INR History:      Latest Ref Rng & Units 9/3/2021     8:28 AM 10/4/2021     8:44 AM 11/4/2021     9:09 AM 3/21/2023    12:00 AM 3/29/2023    10:09 AM 4/11/2023     8:39 AM 4/11/2023     9:30 AM   Anticoagulation Monitoring   INR      2.30  3.10   INR Date      3/21/2023  4/11/2023   INR Goal      2.5-3.5  2.5-3.5   Trend        Same   Last Week Total      55 mg  130 mg   Next Week Total      130 mg  130 mg   Sun      17.5 mg  17.5 mg   Mon      20 mg  20 mg   Tue      17.5 mg  17.5 mg   Wed      20 mg  20 mg   Thu      17.5 mg  17.5 mg   Fri      20 mg  20 mg   Sat      17.5 mg  17.5 mg   Historical INR 0.90 - 1.10 3.00   4.00   2.60   2.30       3.10      Visit Report    Report   Report Report       This result is from an external source.       Plan:  1. INR is Therapeutic today- see above in Anticoagulation Summary.  He confirmed warfarin dosing as  alexander.  Will instruct Blayne Grijalva to Continue their warfarin regimen- see above in Anticoagulation Summary.  2. Follow up in 2 weeks--he is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 usually, saw NP today so did lab.  3. Patient declines warfarin refills (NP already doing this).  4. Verbal information provided. Patient expresses understanding and has no further questions at this time.    Karen Nash [facility]

## 2023-05-08 ENCOUNTER — ANTICOAGULATION VISIT (OUTPATIENT)
Dept: PHARMACY | Facility: HOSPITAL | Age: 58
End: 2023-05-08
Payer: COMMERCIAL

## 2023-05-08 DIAGNOSIS — Z79.01 ANTICOAGULANT LONG-TERM USE: ICD-10-CM

## 2023-05-08 DIAGNOSIS — D68.51 FACTOR V LEIDEN MUTATION: Primary | ICD-10-CM

## 2023-05-08 DIAGNOSIS — I82.432 ACUTE DEEP VEIN THROMBOSIS (DVT) OF POPLITEAL VEIN OF LEFT LOWER EXTREMITY: ICD-10-CM

## 2023-05-08 LAB — INR PPP: 4.5

## 2023-05-08 NOTE — PROGRESS NOTES
Anticoagulation Clinic Progress Note    Patient's visit was held by phone today.    Anticoagulation Summary  As of 2023    INR goal:  2.5-3.5   TTR:  52.0 % (1.3 mo)   INR used for dosin.50 (2023)   Warfarin maintenance plan:  20 mg (5 mg x 4) every Mon, Wed, Fri; 17.5 mg (5 mg x 3.5) all other days; Starting 2023   Weekly warfarin total:  130 mg   Plan last modified:  Karen Nash RPH (3/30/2023)   Next INR check:  5/15/2023   Priority:  High   Target end date:  Indefinite    Indications    Factor V Leiden mutation [D68.51]  Anticoagulant long-term use [Z79.01]  Acute deep vein thrombosis (DVT) of popliteal vein of left lower extremity [I82.432]             Anticoagulation Episode Summary     INR check location:      Preferred lab:      Send INR reminders to:  [facility] CBC ANTICOAG POOL    Comments:  [facility]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, Doac failure      Anticoagulation Care Providers     Provider Role Specialty Phone number    Lenny Shin MD Referring Hematology and Oncology [phone]          Drug interactions: has remained unchanged.  Diet: has remained unchanged.    Clinic Interview:  He rode many miles over the weekend in preparation for upcoming bike race.    INR History:      2021     9:09 AM 3/21/2023    12:00 AM 3/29/2023    10:09 AM 2023     8:39 AM 2023     9:30 AM 2023    12:00 AM 2023    10:06 AM   Anticoagulation Monitoring   INR   2.30  3.10  4.50   INR Date   3/21/2023  2023  2023   INR Goal   2.5-3.5  2.5-3.5  2.5-3.5   Trend     Same  Same   Last Week Total   55 mg  130 mg  130 mg   Next Week Total   130 mg  130 mg  120 mg   Sun   17.5 mg  17.5 mg  17.5 mg   Mon   20 mg  20 mg  10 mg ()   Tue   17.5 mg  17.5 mg  17.5 mg   Wed   20 mg  20 mg  20 mg   Thu   17.5 mg  17.5 mg  17.5 mg   Fri   20 mg  20 mg  20 mg   Sat   17.5 mg  17.5 mg  17.5 mg   Historical INR 2.60   2.30       3.10    4.50         Visit Report Report   Report Report         This result  is from an external source.       Plan:  1. INR is Supratherapeutic today- see above in Anticoagulation Summary.   Will instruct Blayne Grijalva to reduce today only to 10mg, then Continue their warfarin regimen- see above in Anticoagulation Summary.  2. Follow up in 1 week--home test  3.They have been instructed to call if any changes in medications, doses, concerns, etc. Patient expresses understanding and has no further questions at this time.    Karen Nash RP

## 2023-06-15 ENCOUNTER — TELEPHONE (OUTPATIENT)
Dept: PHARMACY | Facility: HOSPITAL | Age: 58
End: 2023-06-15
Payer: COMMERCIAL

## 2023-06-15 NOTE — TELEPHONE ENCOUNTER
Left VM that he was due for home INR test to be submitted last week.  We will call when he submits INR to [facility] soon.

## 2023-08-08 ENCOUNTER — ANTICOAGULATION VISIT (OUTPATIENT)
Dept: PHARMACY | Facility: HOSPITAL | Age: 58
End: 2023-08-08
Payer: COMMERCIAL

## 2023-08-08 DIAGNOSIS — I82.432 ACUTE DEEP VEIN THROMBOSIS (DVT) OF POPLITEAL VEIN OF LEFT LOWER EXTREMITY: ICD-10-CM

## 2023-08-08 DIAGNOSIS — D68.51 FACTOR V LEIDEN MUTATION: Primary | ICD-10-CM

## 2023-08-08 DIAGNOSIS — Z79.01 ANTICOAGULANT LONG-TERM USE: ICD-10-CM

## 2023-08-08 LAB — INR PPP: 3.2

## 2023-08-08 NOTE — PROGRESS NOTES
Anticoagulation Clinic Progress Note    Patient's visit was held by phone today.(Left VM x2) Gave instructions for dosing/follow up.     Anticoagulation Summary  As of 8/8/2023      INR goal:  2.5-3.5   TTR:  59.6 % (4.4 mo)   INR used for dosing:  3.20 (8/8/2023)   Warfarin maintenance plan:  20 mg (5 mg x 4) every Mon, Wed, Fri; 17.5 mg (5 mg x 3.5) all other days   Weekly warfarin total:  130 mg   Plan last modified:  Karen Nash RPH (3/30/2023)   Next INR check:  9/5/2023   Priority:  Maintenance   Target end date:  Indefinite    Indications    Factor V Leiden mutation [D68.51]  Anticoagulant long-term use [Z79.01]  Acute deep vein thrombosis (DVT) of popliteal vein of left lower extremity [I82.432]                 Anticoagulation Episode Summary       INR check location:      Preferred lab:      Send INR reminders to:  [facility] CBC ANTICOAG POOL    Comments:  [facility]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, Doac failure          Anticoagulation Care Providers       Provider Role Specialty Phone number    Lenny Shin MD Referring Hematology and Oncology [phone]            Drug interactions: has remained unchanged.  Diet: has remained unchanged.    Clinic Interview:  No pertinent clinical findings have been reported.    INR History:      4/11/2023     9:30 AM 5/8/2023    12:00 AM 5/8/2023    10:06 AM 6/20/2023    12:00 AM 6/20/2023     1:10 PM 8/8/2023    12:00 AM 8/8/2023     8:45 AM   Anticoagulation Monitoring   INR 3.10  4.50  2.30  3.20   INR Date 4/11/2023 5/8/2023 6/20/2023 8/8/2023   INR Goal 2.5-3.5  2.5-3.5  2.5-3.5  2.5-3.5   Trend Same  Same  Same  Same   Last Week Total 130 mg  130 mg  130 mg  130 mg   Next Week Total 130 mg  120 mg  130 mg  130 mg   Sun 17.5 mg  17.5 mg  17.5 mg  17.5 mg   Mon 20 mg  10 mg (5/8)  20 mg  20 mg   Tue 17.5 mg  17.5 mg  17.5 mg  17.5 mg   Wed 20 mg  20 mg  20 mg  20 mg   Thu 17.5 mg  17.5 mg  17.5 mg  17.5 mg   Fri 20 mg  20 mg  20 mg  20 mg   Sat 17.5 mg  17.5 mg  17.5 mg  17.5  mg   Historical INR  4.50      2.30      3.20        Visit Report Report             This result is from an external source.       Plan:  1. INR is Therapeutic today- see above in Anticoagulation Summary.   Will instruct Blayne Grijalva to Continue their warfarin regimen- see above in Anticoagulation Summary.  2. Follow up in 4 weeks  3.They have been instructed to call if any changes in medications, doses, concerns, etc. Patient expresses understanding and has no further questions at this time.    Stanislav Butler [facility]

## 2023-09-01 ENCOUNTER — ANTICOAGULATION VISIT (OUTPATIENT)
Dept: PHARMACY | Facility: HOSPITAL | Age: 58
End: 2023-09-01
Payer: COMMERCIAL

## 2023-09-01 DIAGNOSIS — D68.51 FACTOR V LEIDEN MUTATION: Primary | ICD-10-CM

## 2023-09-01 DIAGNOSIS — I82.432 ACUTE DEEP VEIN THROMBOSIS (DVT) OF POPLITEAL VEIN OF LEFT LOWER EXTREMITY: ICD-10-CM

## 2023-09-01 DIAGNOSIS — Z79.01 ANTICOAGULANT LONG-TERM USE: ICD-10-CM

## 2023-09-01 LAB — INR PPP: 4

## 2023-09-01 NOTE — PROGRESS NOTES
Anticoagulation Clinic Progress Note    Patient's visit was held by phone today.    Anticoagulation Summary  As of 2023      INR goal:  2.5-3.5   TTR:  56.2 % (5.2 mo)   INR used for dosin.00 (2023)   Warfarin maintenance plan:  20 mg (5 mg x 4) every Mon, Wed, Fri; 17.5 mg (5 mg x 3.5) all other days   Weekly warfarin total:  130 mg   Plan last modified:  Karen aNsh RPH (3/30/2023)   Next INR check:     Priority:  Maintenance   Target end date:  Indefinite    Indications    Factor V Leiden mutation [D68.51]  Anticoagulant long-term use [Z79.01]  Acute deep vein thrombosis (DVT) of popliteal vein of left lower extremity [I82.432]                 Anticoagulation Episode Summary       INR check location:      Preferred lab:      Send INR reminders to:  [facility] CBC ANTICOAG POOL    Comments:  [facility]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, Doac failure          Anticoagulation Care Providers       Provider Role Specialty Phone number    Lenny Shin MD Referring Hematology and Oncology [phone]            Drug interactions: has remained unchanged.  Diet: has remained unchanged.    Clinic Interview:  No pertinent clinical findings have been reported.    INR History:      2023    10:06 AM 2023    12:00 AM 2023     1:10 PM 2023    12:00 AM 2023     8:45 AM 2023    12:00 AM 2023     3:33 PM   Anticoagulation Monitoring   INR 4.50  2.30  3.20  4.00   INR Date 2023   INR Goal 2.5-3.5  2.5-3.5  2.5-3.5  2.5-3.5   Trend Same  Same  Same  Same   Last Week Total 130 mg  130 mg  130 mg  130 mg   Next Week Total 120 mg  130 mg  130 mg  125 mg   Sun 17.5 mg  17.5 mg  17.5 mg  17.5 mg   Mon 10 mg ()  20 mg  20 mg  20 mg   Tue 17.5 mg  17.5 mg  17.5 mg  17.5 mg   Wed 20 mg  20 mg  20 mg  20 mg   Thu 17.5 mg  17.5 mg  17.5 mg  17.5 mg   Fri 20 mg  20 mg  20 mg  15 mg ()   Sat 17.5 mg  17.5 mg  17.5 mg  17.5 mg   Historical INR  2.30      3.20      4.00             This result is from an external source.       Plan:  1. INR is Supratherapeutic today- see above in Anticoagulation Summary.   Will instruct Blayne Grijalva to decrease warfarin dose to 15mg today, then Continue their warfarin regimen- see above in Anticoagulation Summary.  2. Follow up in 2 weeks--usually once per month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 but need to review early since elevated today.  3.They have been instructed to call if any changes in medications, doses, concerns, etc. Left VM with instructions and to call back with updates or questions.   Karen Nash RPH

## 2023-09-27 RX ORDER — WARFARIN SODIUM 5 MG/1
TABLET ORAL
Qty: 20 TABLET | Refills: 0 | Status: SHIPPED | OUTPATIENT
Start: 2023-09-27

## 2023-10-02 ENCOUNTER — TELEPHONE (OUTPATIENT)
Dept: ONCOLOGY | Facility: CLINIC | Age: 58
End: 2023-10-02
Payer: COMMERCIAL

## 2023-10-02 NOTE — TELEPHONE ENCOUNTER
Hi, I have run out of my warfarin. There was a prescription supposed to be called in to the [facility] at [address], but it was not there when I went to pick it up. So, if someone could please submit a prescription to [facility]. I only need about 7 or 8 days supply because I have got an auto refill from mail order that is supposed to ship out on Tuesday. Thank you.  10/01/2023 03:23 PM  This was left on [facility]

## 2023-10-02 NOTE — TELEPHONE ENCOUNTER
Called [facility] to let him know I talked with [facility] and they took the coumadin order over the phone. Patient doid not answer but a v/m ws left.

## 2023-10-03 RX ORDER — WARFARIN SODIUM 5 MG/1
TABLET ORAL
Qty: 40 TABLET | Refills: 0 | Status: SHIPPED | OUTPATIENT
Start: 2023-10-03

## 2023-10-11 ENCOUNTER — TELEPHONE (OUTPATIENT)
Dept: PHARMACY | Facility: HOSPITAL | Age: 58
End: 2023-10-11
Payer: COMMERCIAL

## 2023-10-11 NOTE — TELEPHONE ENCOUNTER
Attempted to contact patient regarding overdue INR monitoring and warfarin regimen, as patient was due to have INR checked on 9/15/2023. Left voicemail for patient to return call to Anticoagulation Clinic [phone] when available.

## 2023-10-12 ENCOUNTER — ANTICOAGULATION VISIT (OUTPATIENT)
Dept: PHARMACY | Facility: HOSPITAL | Age: 58
End: 2023-10-12
Payer: COMMERCIAL

## 2023-10-12 ENCOUNTER — TELEPHONE (OUTPATIENT)
Dept: PHARMACY | Facility: HOSPITAL | Age: 58
End: 2023-10-12
Payer: COMMERCIAL

## 2023-10-12 DIAGNOSIS — D68.51 FACTOR V LEIDEN MUTATION: Primary | ICD-10-CM

## 2023-10-12 DIAGNOSIS — Z79.01 ANTICOAGULANT LONG-TERM USE: ICD-10-CM

## 2023-10-12 DIAGNOSIS — I82.432 ACUTE DEEP VEIN THROMBOSIS (DVT) OF POPLITEAL VEIN OF LEFT LOWER EXTREMITY: ICD-10-CM

## 2023-10-12 LAB — INR PPP: 1.9

## 2023-10-12 NOTE — PROGRESS NOTES
Anticoagulation Clinic Progress Note    Patient's visit was held by phone today.    Anticoagulation Summary  As of 10/12/2023      INR goal:  2.5-3.5   TTR:  54.4% (6.6 mo)   INR used for dosin.90 (10/12/2023)   Warfarin maintenance plan:  20 mg (5 mg x 4) every Mon, Wed, Fri; 17.5 mg (5 mg x 3.5) all other days   Weekly warfarin total:  130 mg   Plan last modified:  Karen Nash RPH (3/30/2023)   Next INR check:  10/19/2023   Priority:  Maintenance   Target end date:  Indefinite    Indications    Factor V Leiden mutation [D68.51]  Anticoagulant long-term use [Z79.01]  Acute deep vein thrombosis (DVT) of popliteal vein of left lower extremity [I82.432]                 Anticoagulation Episode Summary       INR check location:      Preferred lab:      Send INR reminders to:  [facility] CBC ANTICOAG POOL    Comments:  [facility]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  Diet: has remained unchanged.    Clinic Interview:  No pertinent clinical findings have been reported.    INR History:      2023     1:10 PM 2023    12:00 AM 2023     8:45 AM 2023    12:00 AM 2023     3:33 PM 10/12/2023    12:00 AM 10/12/2023     9:12 AM   Anticoagulation Monitoring   INR 2.30  3.20  4.00  1.90   INR Date 2023  2023  2023  10/12/2023   INR Goal 2.5-3.5  2.5-3.5  2.5-3.5  2.5-3.5   Trend Same  Same  Same  Same   Last Week Total 130 mg  130 mg  130 mg  130 mg   Next Week Total 130 mg  130 mg  125 mg  132.5 mg   Sun 17.5 mg  17.5 mg  17.5 mg  17.5 mg   Mon 20 mg  20 mg  20 mg  20 mg   Tue 17.5 mg  17.5 mg  17.5 mg  17.5 mg   Wed 20 mg  20 mg  20 mg  20 mg   Thu 17.5 mg  17.5 mg  17.5 mg  20 mg (10/12)   Fri 20 mg  20 mg  15 mg (); Otherwise 20 mg  20 mg   Sat 17.5 mg  17.5 mg  17.5 mg  17.5 mg   Historical INR   3.20      4.00      1.90            This result is from an external source.       Plan:  1. INR is Subtherapeutic today- see above in Anticoagulation Summary.   Will instruct Blayne WYATT Rudi to take 4 tablets (20mg) tonight and resume regular schedule and recheck in 1 week - see above in Anticoagulation Summary.  2. Follow up in 1 weeks  3.They have been instructed to call if any changes in medications, doses, concerns, etc. Patient expresses understanding and has no further questions at this time.    Nel Matute [facility]

## 2023-10-19 ENCOUNTER — ANTICOAGULATION VISIT (OUTPATIENT)
Dept: PHARMACY | Facility: HOSPITAL | Age: 58
End: 2023-10-19
Payer: COMMERCIAL

## 2023-10-19 DIAGNOSIS — Z79.01 ANTICOAGULANT LONG-TERM USE: ICD-10-CM

## 2023-10-19 DIAGNOSIS — D68.51 FACTOR V LEIDEN MUTATION: Primary | ICD-10-CM

## 2023-10-19 DIAGNOSIS — I82.432 ACUTE DEEP VEIN THROMBOSIS (DVT) OF POPLITEAL VEIN OF LEFT LOWER EXTREMITY: ICD-10-CM

## 2023-10-19 LAB — INR PPP: 2.2

## 2023-10-19 NOTE — PROGRESS NOTES
Anticoagulation Clinic Progress Note    Patient's visit was held by phone today.    Anticoagulation Summary  As of 10/19/2023      INR goal:  2.5-3.5   TTR:  52.5% (6.8 mo)   INR used for dosin.20 (10/19/2023)   Warfarin maintenance plan:  17.5 mg (5 mg x 3.5) every Tue, Sat; 20 mg (5 mg x 4) all other days   Weekly warfarin total:  135 mg   Plan last modified:  Nel Matute RPH (10/19/2023)   Next INR check:  10/26/2023   Priority:  Maintenance   Target end date:  Indefinite    Indications    Factor V Leiden mutation [D68.51]  Anticoagulant long-term use [Z79.01]  Acute deep vein thrombosis (DVT) of popliteal vein of left lower extremity [I82.432]                 Anticoagulation Episode Summary       INR check location:      Preferred lab:      Send INR reminders to:  [facility] CBC ANTICOAG POOL    Comments:  [facility]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  Diet: has remained unchanged.    Clinic Interview:  No pertinent clinical findings have been reported.  Pt activity level very high. Increase weekly maint dose - 17.5mg on Tues and Sat and 20mg AOD's. Will retest on 10/26 to see how INR responds to increase. If patient notices any s/sx of bleeding - blood on toothbrush, etc. Instructed to retest sooner. Patient reports no changes in diet/health/meds    INR History:      2023     8:45 AM 2023    12:00 AM 2023     3:33 PM 10/12/2023    12:00 AM 10/12/2023     9:12 AM 10/19/2023    12:00 AM 10/19/2023     9:59 AM   Anticoagulation Monitoring   INR 3.20  4.00  1.90  2.20   INR Date 2023  2023  10/12/2023  10/19/2023   INR Goal 2.5-3.5  2.5-3.5  2.5-3.5  2.5-3.5   Trend Same  Same  Same  Up   Last Week Total 130 mg  130 mg  130 mg  132.5 mg   Next Week Total 130 mg  125 mg  132.5 mg  135 mg   Sun  17.5 mg  17.5 mg  17.5 mg  20 mg   Mon 20 mg  20 mg  20 mg  20 mg   Tue 17.5 mg  17.5 mg  17.5 mg  17.5 mg   Wed 20 mg  20 mg  20 mg  20 mg   Thu 17.5 mg  17.5 mg  20 mg (10/12)  20 mg   Fri 20 mg  15 mg (9/1); Otherwise 20 mg  20 mg  20 mg   Sat 17.5 mg  17.5 mg  17.5 mg  17.5 mg   Historical INR  4.00      1.90      2.20            This result is from an external source.       Plan:  1. INR is Subtherapeutic today- see above in Anticoagulation Summary.   Will instruct Blayne EDMUNDO Grijalva to Change their warfarin regimen- see above in Anticoagulation Summary.  2. Follow up in 1 weeks  3.They have been instructed to call if any changes in medications, doses, concerns, etc. Patient expresses understanding and has no further questions at this time.    Nel Matute [facility]

## 2023-10-26 ENCOUNTER — ANTICOAGULATION VISIT (OUTPATIENT)
Dept: PHARMACY | Facility: HOSPITAL | Age: 58
End: 2023-10-26
Payer: COMMERCIAL

## 2023-10-26 DIAGNOSIS — I82.432 ACUTE DEEP VEIN THROMBOSIS (DVT) OF POPLITEAL VEIN OF LEFT LOWER EXTREMITY: ICD-10-CM

## 2023-10-26 DIAGNOSIS — Z79.01 ANTICOAGULANT LONG-TERM USE: ICD-10-CM

## 2023-10-26 DIAGNOSIS — D68.51 FACTOR V LEIDEN MUTATION: Primary | ICD-10-CM

## 2023-10-26 LAB — INR PPP: 2.2

## 2023-10-26 NOTE — PROGRESS NOTES
Attempted to contact patient regarding INR monitoring and warfarin regimen. Left voicemail for patient to return call to Anticoagulation Clinic [phone] when available.

## 2023-10-31 NOTE — PROGRESS NOTES
Anticoagulation Clinic Progress Note    Patient's visit was held by phone today.    Anticoagulation Summary  As of 10/26/2023      INR goal:  2.5-3.5   TTR:  50.8% (7 mo)   INR used for dosin.20 (10/26/2023)   Warfarin maintenance plan:  17.5 mg (5 mg x 3.5) every e, Sat; 20 mg (5 mg x 4) all other days   Weekly warfarin total:  135 mg   Plan last modified:  Nel Matute RPH (10/19/2023)   Next INR check:  11/3/2023   Priority:  Maintenance   Target end date:  Indefinite    Indications    Factor V Leiden mutation [D68.51]  Anticoagulant long-term use [Z79.01]  Acute deep vein thrombosis (DVT) of popliteal vein of left lower extremity [I82.432]                 Anticoagulation Episode Summary       INR check location:      Preferred lab:      Send INR reminders to:  [facility] CBC ANTICOAG POOL    Comments:  [facility]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  Diet: has remained unchanged.    Clinic Interview:  No pertinent clinical findings have been reported.  10/26 INR 2.2 LVM; talked on 10/31 - confirmed dosing - patient took 17.5mg  and 20mg AOD's - no other changes - may need to bump him to 20mg every day - patient very active  INR History:      2023     3:33 PM 10/12/2023    12:00 AM 10/12/2023     9:12 AM 10/19/2023    12:00 AM 10/19/2023     9:59 AM 10/26/2023    12:00 AM 10/26/2023     8:59 AM   Anticoagulation Monitoring   INR 4.00  1.90  2.20  2.20   INR Date 2023  10/12/2023  10/19/2023  10/26/2023   INR Goal 2.5-3.5  2.5-3.5  2.5-3.5  2.5-3.5   Trend Same  Same  Up  Same   Last Week Total 130 mg  130 mg  132.5 mg  135 mg   Next Week Total 125 mg  132.5 mg  135 mg  137.5 mg   Sun 17.5 mg  17.5 mg  20 mg  17.5 mg (10/29)   Mon 20 mg  20 mg  20 mg  20 mg   Tue 17.5 mg  17.5 mg  17.5 mg  20 mg  (10/31)   Wed 20 mg  20 mg  20 mg  20 mg   Thu 17.5 mg  20 mg (10/12)  20 mg  20 mg   Fri 15 mg (9/1); Otherwise 20 mg  20 mg  20 mg  20 mg   Sat 17.5 mg  17.5 mg  17.5 mg  20 mg (10/28)   Historical INR  1.90      2.20      2.20            This result is from an external source.       Plan:  1. INR is Subtherapeutic today- see above in Anticoagulation Summary.   Will instruct Blayne Grijalva to Change their warfarin regimen- see above in Anticoagulation Summary.  2. Follow up in 8 days  3.They have been instructed to call if any changes in medications, doses, concerns, etc. Patient expresses understanding and has no further questions at this time.    Nel Matute [facility]

## 2023-11-03 ENCOUNTER — ANTICOAGULATION VISIT (OUTPATIENT)
Dept: PHARMACY | Facility: HOSPITAL | Age: 58
End: 2023-11-03
Payer: COMMERCIAL

## 2023-11-03 DIAGNOSIS — I82.432 ACUTE DEEP VEIN THROMBOSIS (DVT) OF POPLITEAL VEIN OF LEFT LOWER EXTREMITY: ICD-10-CM

## 2023-11-03 DIAGNOSIS — Z79.01 ANTICOAGULANT LONG-TERM USE: ICD-10-CM

## 2023-11-03 DIAGNOSIS — D68.51 FACTOR V LEIDEN MUTATION: Primary | ICD-10-CM

## 2023-11-03 LAB — INR PPP: 2.9

## 2023-11-03 NOTE — PROGRESS NOTES
Anticoagulation Clinic Progress Note    Patient's visit was held by phone today.    Anticoagulation Summary  As of 11/3/2023      INR goal:  2.5-3.5   TTR:  51.0% (7.3 mo)   INR used for dosin.90 (11/3/2023)   Warfarin maintenance plan:  17.5 mg (5 mg x 3.5) every Sun; 20 mg (5 mg x 4) all other days   Weekly warfarin total:  137.5 mg   Plan last modified:  Karen Nash RPH (11/3/2023)   Next INR check:     Priority:  Maintenance   Target end date:  Indefinite    Indications    Factor V Leiden mutation [D68.51]  Anticoagulant long-term use [Z79.01]  Acute deep vein thrombosis (DVT) of popliteal vein of left lower extremity [I82.432]                 Anticoagulation Episode Summary       INR check location:      Preferred lab:      Send INR reminders to:  [facility] CBC ANTICOAG POOL    Comments:  [facility]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, Doac failure          Anticoagulation Care Providers       Provider Role Specialty Phone number    Lenny Shin MD Referring Hematology and Oncology [phone]          INR History:      10/12/2023     9:12 AM 10/19/2023    12:00 AM 10/19/2023     9:59 AM 10/26/2023    12:00 AM 10/26/2023     8:59 AM 11/3/2023    12:00 AM 11/3/2023     2:26 PM   Anticoagulation Monitoring   INR 1.90  2.20  2.20  2.90   INR Date 10/12/2023  10/19/2023  10/26/2023  11/3/2023   INR Goal 2.5-3.5  2.5-3.5  2.5-3.5  2.5-3.5   Trend Same  Up  Same  Up   Last Week Total 130 mg  132.5 mg  135 mg  137.5 mg   Next Week Total 132.5 mg  135 mg  137.5 mg  137.5 mg   Sun 17.5 mg  20 mg  17.5 mg (10/29)  17.5 mg   Mon 20 mg  20 mg  20 mg  20 mg   Tue 17.5 mg  17.5 mg  20 mg (10/31)  20 mg   Wed 20 mg  20 mg  20 mg  20 mg   Thu 20 mg (10/12)  20 mg  20 mg  20 mg   Fri 20 mg  20 mg  20 mg  20 mg   Sat 17.5 mg  17.5 mg  20 mg (10/28)  20 mg   Historical INR  2.20      2.20      2.90            This result is from an external source.       Plan:  1. INR is Therapeutic today- see above in Anticoagulation Summary.   Will instruct  Blayne Grijalva to take warfarin 17.5mg only on Sundays, 20mg on all other days- see above in Anticoagulation Summary.  2. Follow up in 1 week--home test  3.They have been instructed to call if any changes in medications, doses, concerns, etc. Left VM with instructions and to call back with updates or questions.   Karen Nash RP

## 2023-12-08 ENCOUNTER — ANTICOAGULATION VISIT (OUTPATIENT)
Dept: PHARMACY | Facility: SURGERY CENTER | Age: 58
End: 2023-12-08
Payer: COMMERCIAL

## 2023-12-08 DIAGNOSIS — I82.432 ACUTE DEEP VEIN THROMBOSIS (DVT) OF POPLITEAL VEIN OF LEFT LOWER EXTREMITY: ICD-10-CM

## 2023-12-08 DIAGNOSIS — D68.51 FACTOR V LEIDEN MUTATION: Primary | ICD-10-CM

## 2023-12-08 DIAGNOSIS — Z79.01 ANTICOAGULANT LONG-TERM USE: ICD-10-CM

## 2023-12-08 LAB — INR PPP: 2.9

## 2023-12-08 NOTE — PROGRESS NOTES
Anticoagulation Clinic Progress Note    Anticoagulation Summary  As of 2023      INR goal:  2.5-3.5   TTR:  57.8% (8.5 mo)   INR used for dosin.90 (2023)   Warfarin maintenance plan:  17.5 mg (5 mg x 3.5) every Sun; 20 mg (5 mg x 4) all other days   Weekly warfarin total:  137.5 mg   No change documented:  Tasha Castrejon RPH   Plan last modified:  Karen Nash RPH (11/3/2023)   Next INR check:  2024   Priority:  Maintenance   Target end date:  Indefinite    Indications    Factor V Leiden mutation [D68.51]  Anticoagulant long-term use [Z79.01]  Acute deep vein thrombosis (DVT) of popliteal vein of left lower extremity [I82.432]                 Anticoagulation Episode Summary       INR check location:      Preferred lab:      Send INR reminders to:  [facility] CBC ANTICOAG POOL    Comments:  [facility]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, Doac failure          Anticoagulation Care Providers       Provider Role Specialty Phone number    Lenny Shin MD Referring Hematology and Oncology [phone]            INR History:      10/19/2023     9:59 AM 10/26/2023    12:00 AM 10/26/2023     8:59 AM 11/3/2023    12:00 AM 11/3/2023     2:26 PM 2023    12:00 AM 2023     9:43 AM   Anticoagulation Monitoring   INR 2.20  2.20  2.90  2.90   INR Date 10/19/2023  10/26/2023  11/3/2023  2023   INR Goal 2.5-3.5  2.5-3.5  2.5-3.5  2.5-3.5   Trend Up  Same  Up  Same   Last Week Total 132.5 mg  135 mg  137.5 mg  137.5 mg   Next Week Total 135 mg  137.5 mg  137.5 mg  137.5 mg   Sun 20 mg  17.5 mg (10/29)  17.5 mg  17.5 mg   Mon 20 mg  20 mg  20 mg  20 mg   Tue 17.5 mg  20 mg (10/31)  20 mg  20 mg   Wed 20 mg  20 mg  20 mg  20 mg   Thu 20 mg  20 mg  20 mg  20 mg   Fri 20 mg  20 mg  20 mg  20 mg   Sat 17.5 mg  20 mg (10/28)  20 mg  20 mg   Historical INR  2.20      2.90      2.90            This result is from an external source.       Plan:  1. INR is Therapeutic today- see above in Anticoagulation Summary.   Will instruct  Blayne Grijalva to Continue their warfarin regimen- see above in Anticoagulation Summary.  2. Follow up in 1 month - home test  3.They have been instructed to call if any changes in medications, doses, concerns, etc.    Tasha Castrejon RPH

## 2024-01-17 ENCOUNTER — TELEPHONE (OUTPATIENT)
Dept: PHARMACY | Facility: HOSPITAL | Age: 59
End: 2024-01-17
Payer: COMMERCIAL

## 2024-01-19 ENCOUNTER — ANTICOAGULATION VISIT (OUTPATIENT)
Dept: PHARMACY | Facility: HOSPITAL | Age: 59
End: 2024-01-19
Payer: COMMERCIAL

## 2024-01-19 DIAGNOSIS — I82.432 ACUTE DEEP VEIN THROMBOSIS (DVT) OF POPLITEAL VEIN OF LEFT LOWER EXTREMITY: ICD-10-CM

## 2024-01-19 DIAGNOSIS — D68.51 FACTOR V LEIDEN MUTATION: Primary | ICD-10-CM

## 2024-01-19 DIAGNOSIS — Z79.01 ANTICOAGULANT LONG-TERM USE: ICD-10-CM

## 2024-01-19 LAB — INR PPP: 2.9

## 2024-01-19 NOTE — PROGRESS NOTES
Anticoagulation Clinic Progress Note    Patient's visit was held by phone today.    Anticoagulation Summary  As of 2024      INR goal:  2.5-3.5   TTR:  63.8% (9.9 mo)   INR used for dosin.90 (2024)   Warfarin maintenance plan:  17.5 mg (5 mg x 3.5) every Sun; 20 mg (5 mg x 4) all other days   Weekly warfarin total:  137.5 mg   No change documented:  Nel Matute RPH   Plan last modified:  Karen Nash RPH (11/3/2023)   Next INR check:  2024   Priority:  Maintenance   Target end date:  Indefinite    Indications    Factor V Leiden mutation [D68.51]  Anticoagulant long-term use [Z79.01]  Acute deep vein thrombosis (DVT) of popliteal vein of left lower extremity [I82.432]                 Anticoagulation Episode Summary       INR check location:      Preferred lab:      Send INR reminders to:  [facility] CBC ANTICOAG POOL    Comments:  [facility]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  Diet: has remained unchanged.    Clinic Interview:  No pertinent clinical findings have been reported.    INR History:      10/26/2023     8:59 AM 11/3/2023    12:00 AM 11/3/2023     2:26 PM 2023    12:00 AM 2023     9:43 AM 2024    12:00 AM 2024     8:00 AM   Anticoagulation Monitoring   INR 2.20  2.90  2.90  2.90   INR Date 10/26/2023  11/3/2023  2023  2024   INR Goal 2.5-3.5  2.5-3.5  2.5-3.5  2.5-3.5   Trend Same  Up  Same  Same   Last Week Total 135 mg  137.5 mg  137.5 mg  137.5 mg   Next Week Total 137.5 mg  137.5 mg  137.5 mg  137.5 mg   Sun 17.5 mg (10/29)  17.5 mg  17.5 mg  17.5 mg   Mon 20 mg  20 mg  20 mg  20 mg   Tue 20 mg (10/31)  20 mg  20 mg  20 mg   Wed 20 mg  20 mg  20 mg  20 mg   Thu 20 mg  20 mg  20 mg  20 mg   Fri 20 mg  20 mg  20 mg  20 mg   Sat 20 mg (10/28)  20 mg  20 mg  20  mg   Historical INR  2.90      2.90      2.90            This result is from an external source.       Plan:  1. INR is Therapeutic today- see above in Anticoagulation Summary.   Will instruct Blayne Grijalva to Continue their warfarin regimen- see above in Anticoagulation Summary.  2. Follow up in 4 weeks  3.They have been instructed to call if any changes in medications, doses, concerns, etc. Patient expresses understanding and has no further questions at this time.    Nel Matute RP

## 2024-02-26 RX ORDER — WARFARIN SODIUM 5 MG/1
TABLET ORAL
Qty: 355 TABLET | Refills: 1 | Status: SHIPPED | OUTPATIENT
Start: 2024-02-26

## 2024-02-28 LAB — INR PPP: 2.7

## 2024-02-29 ENCOUNTER — ANTICOAGULATION VISIT (OUTPATIENT)
Dept: PHARMACY | Facility: HOSPITAL | Age: 59
End: 2024-02-29
Payer: COMMERCIAL

## 2024-02-29 DIAGNOSIS — I82.432 ACUTE DEEP VEIN THROMBOSIS (DVT) OF POPLITEAL VEIN OF LEFT LOWER EXTREMITY: ICD-10-CM

## 2024-02-29 DIAGNOSIS — Z79.01 ANTICOAGULANT LONG-TERM USE: ICD-10-CM

## 2024-02-29 DIAGNOSIS — D68.51 FACTOR V LEIDEN MUTATION: Primary | ICD-10-CM

## 2024-02-29 NOTE — PROGRESS NOTES
Anticoagulation Clinic Progress Note    Patient's visit was held by phone today.    Anticoagulation Summary  As of 2024      INR goal:  2.5-3.5   TTR:  68.1% (11.2 mo)   INR used for dosin.70 (2024)   Warfarin maintenance plan:  17.5 mg (5 mg x 3.5) every Sun; 20 mg (5 mg x 4) all other days   Weekly warfarin total:  137.5 mg   Plan last modified:  Karen Nash RPH (11/3/2023)   Next INR check:  3/28/2024   Priority:  Maintenance   Target end date:  Indefinite    Indications    Factor V Leiden mutation [D68.51]  Anticoagulant long-term use [Z79.01]  Acute deep vein thrombosis (DVT) of popliteal vein of left lower extremity [I82.432]                 Anticoagulation Episode Summary       INR check location:      Preferred lab:      Send INR reminders to:  [facility] CBC ANTICOAG POOL    Comments:  [facility]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  Diet: has remained unchanged.    Clinic Interview:  No pertinent clinical findings have been reported.   INR 2.7 Pt traveling to [place] and forgot warfarin 5mg #20 - called in RX to [place], [place] - ph#[phone]; instr pt to take 22.5mg x 4 days per patient preference d/t missed dose  d/t travel - reports no other changes  INR History:      11/3/2023     2:26 PM 2023    12:00 AM 2023     9:43 AM 2024    12:00 AM 2024     8:00 AM 2024    12:00 AM 2024     8:27 AM   Anticoagulation Monitoring   INR 2.90  2.90  2.90  2.70   INR Date 11/3/2023  2023  2024  2024   INR Goal 2.5-3.5  2.5-3.5  2.5-3.5  2.5-3.5   Trend Up  Same  Same  Same   Last Week Total 137.5 mg  137.5 mg  137.5 mg  117.5 mg   Next Week Total 137.5 mg  137.5 mg  137.5 mg  150 mg   Sun 17.5 mg  17.5 mg  17.5 mg  22.5 mg (3/3); Otherwise 17.5 mg   Mon 20  mg  20 mg  20 mg  20 mg   Tue 20 mg  20 mg  20 mg  20 mg   Wed 20 mg  20 mg  20 mg  20 mg   Thu 20 mg  20 mg  20 mg  22.5 mg (2/29); Otherwise 20 mg   Fri 20 mg  20 mg  20 mg  22.5 mg (3/1); Otherwise 20 mg   Sat 20 mg  20 mg  20 mg  22.5 mg (3/2); Otherwise 20 mg   Historical INR  2.90      2.90      2.70  C          C Corrected result    This result is from an external source.       Plan:  1. INR is Therapeutic today- see above in Anticoagulation Summary.   Will instruct Blayne Grijalva to Change their warfarin regimen- see above in Anticoagulation Summary - d/t missed dose d/t travel to [place] - pt to take 22.5mg daily x 4 days and then resume regular maint schedule  2. Follow up in 4 weeks  3.They have been instructed to call if any changes in medications, doses, concerns, etc. Patient expresses understanding and has no further questions at this time.    Nel Matute RP

## 2024-04-04 ENCOUNTER — ANTICOAGULATION VISIT (OUTPATIENT)
Dept: PHARMACY | Facility: HOSPITAL | Age: 59
End: 2024-04-04
Payer: COMMERCIAL

## 2024-04-04 DIAGNOSIS — Z79.01 ANTICOAGULANT LONG-TERM USE: ICD-10-CM

## 2024-04-04 DIAGNOSIS — D68.51 FACTOR V LEIDEN MUTATION: Primary | ICD-10-CM

## 2024-04-04 DIAGNOSIS — I82.432 ACUTE DEEP VEIN THROMBOSIS (DVT) OF POPLITEAL VEIN OF LEFT LOWER EXTREMITY: ICD-10-CM

## 2024-04-04 LAB — INR PPP: 2.6

## 2024-04-04 NOTE — PROGRESS NOTES
Anticoagulation Clinic Progress Note    Patient's visit was held by phone today.    Anticoagulation Summary  As of 2024      INR goal:  2.5-3.5   TTR:  71.2% (1 y)   INR used for dosin.60 (2024)   Warfarin maintenance plan:  17.5 mg (5 mg x 3.5) every Sun; 20 mg (5 mg x 4) all other days   Weekly warfarin total:  137.5 mg   No change documented:  Nel Matute RPH   Plan last modified:  Karen Nash RPH (11/3/2023)   Next INR check:  2024   Priority:  Maintenance   Target end date:  Indefinite    Indications    Factor V Leiden mutation [D68.51]  Anticoagulant long-term use [Z79.01]  Acute deep vein thrombosis (DVT) of popliteal vein of left lower extremity [I82.432]                 Anticoagulation Episode Summary       INR check location:      Preferred lab:      Send INR reminders to:  [facility] CBC ANTICOAG POOL    Comments:  [facility]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  Diet: has remained unchanged.    Clinic Interview:  No pertinent clinical findings have been reported.    INR History:      2023     9:43 AM 2024    12:00 AM 2024     8:00 AM 2024    12:00 AM 2024     8:27 AM 2024    12:00 AM 2024     7:43 AM   Anticoagulation Monitoring   INR 2.90  2.90  2.70  2.60   INR Date 2023   INR Goal 2.5-3.5  2.5-3.5  2.5-3.5  2.5-3.5   Trend Same  Same  Same  Same   Last Week Total 137.5 mg  137.5 mg  117.5 mg  137.5 mg   Next Week Total 137.5 mg  137.5 mg  150 mg  137.5 mg   Sun 17.5 mg  17.5 mg  22.5 mg (3/3); Otherwise 17.5 mg  17.5 mg   Mon 20 mg  20 mg  20 mg  20 mg   Tue 20 mg  20 mg  20 mg  20 mg   Wed 20 mg  20 mg  20 mg  20 mg   Thu 20 mg  20 mg  22.5 mg (); Otherwise 20 mg  20 mg   Fri 20 mg  20 mg  22.5 mg (3/1); Otherwise  20 mg  20 mg   Sat 20 mg  20 mg  22.5 mg (3/2); Otherwise 20 mg  20 mg   Historical INR  2.90      2.70  C     2.60           C Corrected result    This result is from an external source.       Plan:  1. INR is Therapeutic today- see above in Anticoagulation Summary.   Will instruct Blayne Grijalva to Continue their warfarin regimen- see above in Anticoagulation Summary.  2. Follow up in 4 weeks  3.They have been instructed to call if any changes in medications, doses, concerns, etc. Patient expresses understanding and has no further questions at this time.    Nel Matute [facility]

## 2024-05-01 ENCOUNTER — TELEPHONE (OUTPATIENT)
Dept: ONCOLOGY | Facility: CLINIC | Age: 59
End: 2024-05-01

## 2024-05-03 DIAGNOSIS — D68.51 FACTOR V LEIDEN MUTATION: Primary | ICD-10-CM

## 2024-05-06 ENCOUNTER — LAB (OUTPATIENT)
Dept: LAB | Facility: HOSPITAL | Age: 59
End: 2024-05-06
Payer: COMMERCIAL

## 2024-05-06 ENCOUNTER — ANTICOAGULATION VISIT (OUTPATIENT)
Dept: ONCOLOGY | Facility: HOSPITAL | Age: 59
End: 2024-05-06
Payer: COMMERCIAL

## 2024-05-06 ENCOUNTER — OFFICE VISIT (OUTPATIENT)
Dept: ONCOLOGY | Facility: CLINIC | Age: 59
End: 2024-05-06
Payer: COMMERCIAL

## 2024-05-06 VITALS
TEMPERATURE: 98.1 F | RESPIRATION RATE: 18 BRPM | BODY MASS INDEX: 24.82 KG/M2 | HEART RATE: 61 BPM | WEIGHT: 199.6 LBS | OXYGEN SATURATION: 97 % | SYSTOLIC BLOOD PRESSURE: 144 MMHG | HEIGHT: 75 IN | DIASTOLIC BLOOD PRESSURE: 78 MMHG

## 2024-05-06 DIAGNOSIS — D68.51 FACTOR V LEIDEN MUTATION: Primary | ICD-10-CM

## 2024-05-06 DIAGNOSIS — I82.432 ACUTE DEEP VEIN THROMBOSIS (DVT) OF POPLITEAL VEIN OF LEFT LOWER EXTREMITY: ICD-10-CM

## 2024-05-06 DIAGNOSIS — Z79.01 ANTICOAGULANT LONG-TERM USE: ICD-10-CM

## 2024-05-06 DIAGNOSIS — D68.51 FACTOR V LEIDEN MUTATION: ICD-10-CM

## 2024-05-06 LAB
BASOPHILS # BLD AUTO: 0.04 10*3/MM3 (ref 0–0.2)
BASOPHILS NFR BLD AUTO: 0.6 % (ref 0–1.5)
DEPRECATED RDW RBC AUTO: 44.2 FL (ref 37–54)
EOSINOPHIL # BLD AUTO: 0.2 10*3/MM3 (ref 0–0.4)
EOSINOPHIL NFR BLD AUTO: 3.2 % (ref 0.3–6.2)
ERYTHROCYTE [DISTWIDTH] IN BLOOD BY AUTOMATED COUNT: 12.9 % (ref 12.3–15.4)
HCT VFR BLD AUTO: 41.6 % (ref 37.5–51)
HGB BLD-MCNC: 14 G/DL (ref 13–17.7)
IMM GRANULOCYTES # BLD AUTO: 0.01 10*3/MM3 (ref 0–0.05)
IMM GRANULOCYTES NFR BLD AUTO: 0.2 % (ref 0–0.5)
INR PPP: 2.9 (ref 0.9–1.1)
LYMPHOCYTES # BLD AUTO: 2.26 10*3/MM3 (ref 0.7–3.1)
LYMPHOCYTES NFR BLD AUTO: 36.3 % (ref 19.6–45.3)
MCH RBC QN AUTO: 31.5 PG (ref 26.6–33)
MCHC RBC AUTO-ENTMCNC: 33.7 G/DL (ref 31.5–35.7)
MCV RBC AUTO: 93.7 FL (ref 79–97)
MONOCYTES # BLD AUTO: 0.6 10*3/MM3 (ref 0.1–0.9)
MONOCYTES NFR BLD AUTO: 9.6 % (ref 5–12)
NEUTROPHILS NFR BLD AUTO: 3.11 10*3/MM3 (ref 1.7–7)
NEUTROPHILS NFR BLD AUTO: 50.1 % (ref 42.7–76)
NRBC BLD AUTO-RTO: 0 /100 WBC (ref 0–0.2)
PLATELET # BLD AUTO: 209 10*3/MM3 (ref 140–450)
PMV BLD AUTO: 9.3 FL (ref 6–12)
PROTHROMBIN TIME: 34.7 SECONDS (ref 11–13.5)
RBC # BLD AUTO: 4.44 10*6/MM3 (ref 4.14–5.8)
WBC NRBC COR # BLD AUTO: 6.22 10*3/MM3 (ref 3.4–10.8)

## 2024-05-06 PROCEDURE — 85025 COMPLETE CBC W/AUTO DIFF WBC: CPT

## 2024-05-06 PROCEDURE — 85610 PROTHROMBIN TIME: CPT

## 2024-05-06 PROCEDURE — 36415 COLL VENOUS BLD VENIPUNCTURE: CPT

## 2024-05-06 PROCEDURE — 99213 OFFICE O/P EST LOW 20 MIN: CPT | Performed by: INTERNAL MEDICINE

## 2024-05-06 RX ORDER — GUAIFENESIN, PSEUDOEPHEDRINE HYDROCHLORIDE 600; 60 MG/1; MG/1
1 TABLET, EXTENDED RELEASE ORAL EVERY 12 HOURS
COMMUNITY

## 2024-06-05 ENCOUNTER — ANTICOAGULATION VISIT (OUTPATIENT)
Dept: PHARMACY | Facility: HOSPITAL | Age: 59
End: 2024-06-05
Payer: COMMERCIAL

## 2024-06-05 DIAGNOSIS — I82.432 ACUTE DEEP VEIN THROMBOSIS (DVT) OF POPLITEAL VEIN OF LEFT LOWER EXTREMITY: ICD-10-CM

## 2024-06-05 DIAGNOSIS — D68.51 FACTOR V LEIDEN MUTATION: Primary | ICD-10-CM

## 2024-06-05 DIAGNOSIS — Z79.01 ANTICOAGULANT LONG-TERM USE: ICD-10-CM

## 2024-06-05 LAB — INR PPP: 3.3

## 2024-06-05 NOTE — PROGRESS NOTES
Anticoagulation Clinic Progress Note      Anticoagulation Summary  As of 6/5/2024      INR goal:  2.5-3.5   TTR:  75.3% (1.2 y)   INR used for dosing:  3.30 (6/5/2024)   Warfarin maintenance plan:  17.5 mg (5 mg x 3.5) every Sun; 20 mg (5 mg x 4) all other days   Weekly warfarin total:  137.5 mg   Plan last modified:  Karen Nash RPH (11/3/2023)   Next INR check:     Priority:  Maintenance   Target end date:  Indefinite    Indications    Factor V Leiden mutation [D68.51]  Anticoagulant long-term use [Z79.01]  Acute deep vein thrombosis (DVT) of popliteal vein of left lower extremity [I82.432]                 Anticoagulation Episode Summary       INR check location:      Preferred lab:      Send INR reminders to:   [facility] CBC ANTICOAG POOL    Comments:  [employer]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, Doac failure          Anticoagulation Care Providers       Provider Role Specialty Phone number    Lenny Shin MD Referring Hematology and Oncology [phone]          INR History:      2/29/2024     8:27 AM 4/4/2024    12:00 AM 4/4/2024     7:43 AM 5/6/2024     9:12 AM 5/6/2024     9:30 AM 6/5/2024    12:00 AM 6/5/2024     9:43 AM   Anticoagulation Monitoring   INR 2.70  2.60  2.90  3.30   INR Date 2/28/2024 4/4/2024 5/6/2024 6/5/2024   INR Goal 2.5-3.5  2.5-3.5  2.5-3.5  2.5-3.5   Trend Same  Same  Same  Same   Last Week Total 117.5 mg  137.5 mg  137.5 mg  137.5 mg   Next Week Total 150 mg  137.5 mg  137.5 mg  137.5 mg   Sun 22.5 mg (3/3); Otherwise 17.5 mg  17.5 mg  17.5 mg  17.5 mg   Mon 20 mg  20 mg  20 mg  20 mg   Tue 20 mg  20 mg  20 mg  20 mg   Wed 20 mg  20 mg  20 mg  20 mg   Thu 22.5 mg (2/29); Otherwise 20 mg  20 mg  20 mg  20 mg   Fri 22.5 mg (3/1); Otherwise 20 mg  20 mg  20 mg  20 mg   Sat 22.5 mg (3/2); Otherwise 20 mg  20 mg  20 mg  20 mg   Historical INR  2.60      2.90   3.30        Visit Report    Report Report         This result is from an external source.       Plan:  1. INR is Therapeutic today- see above in  Anticoagulation Summary.   Will instruct Blayne WYATT Rudi to Continue their warfarin regimen- see above in Anticoagulation Summary.  2. Follow up in 4 weeks--home tests monthly  3.They have been instructed to call if any changes in medications, doses, concerns, etc. Patient prefers contact for out of range INRs.and has been on stable dosing long term with monthly home testing.    Karen Nash [facility]

## 2024-07-02 ENCOUNTER — ANTICOAGULATION VISIT (OUTPATIENT)
Dept: PHARMACY | Facility: HOSPITAL | Age: 59
End: 2024-07-02
Payer: COMMERCIAL

## 2024-07-02 DIAGNOSIS — Z79.01 ANTICOAGULANT LONG-TERM USE: ICD-10-CM

## 2024-07-02 DIAGNOSIS — D68.51 FACTOR V LEIDEN MUTATION: Primary | ICD-10-CM

## 2024-07-02 DIAGNOSIS — I82.432 ACUTE DEEP VEIN THROMBOSIS (DVT) OF POPLITEAL VEIN OF LEFT LOWER EXTREMITY: ICD-10-CM

## 2024-07-02 LAB — INR PPP: 4.3

## 2024-07-03 NOTE — PROGRESS NOTES
Anticoagulation Clinic Progress Note      Anticoagulation Summary  As of 2024      INR goal:  2.5-3.5   TTR:  72.0% (1.3 y)   INR used for dosin.30 (2024)   Warfarin maintenance plan:  17.5 mg (5 mg x 3.5) every Sun; 20 mg (5 mg x 4) all other days   Weekly warfarin total:  137.5 mg   Plan last modified:  Karen Nash RPH (11/3/2023)   Next INR check:  2024   Priority:  Maintenance   Target end date:  Indefinite    Indications    Factor V Leiden mutation [D68.51]  Anticoagulant long-term use [Z79.01]  Acute deep vein thrombosis (DVT) of popliteal vein of left lower extremity [I82.432]                 Anticoagulation Episode Summary       INR check location:      Preferred lab:      Send INR reminders to:   [facility] CBC ANTICOAG POOL    Comments:  [facility]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, Doac failure          Anticoagulation Care Providers       Provider Role Specialty Phone number    Lenny Shin MD Referring Hematology and Oncology [phone]            INR History:      2024     7:43 AM 2024     9:12 AM 2024     9:30 AM 2024    12:00 AM 2024     9:43 AM 2024    12:00 AM 2024     2:44 PM   Anticoagulation Monitoring   INR 2.60  2.90  3.30  4.30   INR Date 2024   INR Goal 2.5-3.5  2.5-3.5  2.5-3.5  2.5-3.5   Trend Same  Same  Same  Same   Last Week Total 137.5 mg  137.5 mg  137.5 mg  137.5 mg   Next Week Total 137.5 mg  137.5 mg  137.5 mg  127.5 mg   Sun 17.5 mg  17.5 mg  17.5 mg  17.5 mg   Mon 20 mg  20 mg  20 mg  20 mg   Tue 20 mg  20 mg  20 mg  10 mg ()   Wed 20 mg  20 mg  20 mg  20 mg   Thu 20 mg  20 mg  20 mg  20 mg   Fri 20 mg  20 mg  20 mg  20 mg   Sat 20 mg  20 mg  20 mg  20 mg   Historical INR  2.90   3.30      4.30        Visit Report  Report Report           This result is from an external source.       Plan:  1. INR is Supratherapeutic today- see above in Anticoagulation Summary.   Will instruct Blayne Grijalva to take 10 mg today  only - see above in Anticoagulation Summary.  2. Follow up in 1 week  3.They have been instructed to call if any changes in medications, doses, concerns, etc. Left voicemail with dosing instructions since unable to reach patient.    Tasha Castrejon RP

## 2024-07-24 RX ORDER — WARFARIN SODIUM 5 MG/1
TABLET ORAL
Qty: 355 TABLET | Refills: 3 | Status: SHIPPED | OUTPATIENT
Start: 2024-07-24

## 2024-08-14 ENCOUNTER — ANTICOAGULATION VISIT (OUTPATIENT)
Dept: PHARMACY | Facility: HOSPITAL | Age: 59
End: 2024-08-14
Payer: COMMERCIAL

## 2024-08-14 DIAGNOSIS — I82.432 ACUTE DEEP VEIN THROMBOSIS (DVT) OF POPLITEAL VEIN OF LEFT LOWER EXTREMITY: ICD-10-CM

## 2024-08-14 DIAGNOSIS — Z79.01 ANTICOAGULANT LONG-TERM USE: ICD-10-CM

## 2024-08-14 DIAGNOSIS — D68.51 FACTOR V LEIDEN MUTATION: Primary | ICD-10-CM

## 2024-08-14 LAB — INR PPP: 5

## 2024-08-14 NOTE — PROGRESS NOTES
Anticoagulation Clinic Progress Note    Patient's visit was held by phone today.    Anticoagulation Summary  As of 2024      INR goal:  2.5-3.5   TTR:  65.9% (1.4 y)   INR used for dosin.00 (2024)   Warfarin maintenance plan:  17.5 mg (5 mg x 3.5) every Sun; 20 mg (5 mg x 4) all other days   Weekly warfarin total:  137.5 mg   Plan last modified:  Karen Nash RPH (11/3/2023)   Next INR check:  2024   Priority:  Maintenance   Target end date:  Indefinite    Indications    Factor V Leiden mutation [D68.51]  Anticoagulant long-term use [Z79.01]  Acute deep vein thrombosis (DVT) of popliteal vein of left lower extremity [I82.432]                 Anticoagulation Episode Summary       INR check location:      Preferred lab:      Send INR reminders to:   [facility] CBC ANTICOAG POOL    Comments:  [facility]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  Diet: has remained unchanged.    Clinic Interview:  Patient reports COVID-19 infection in late July. Still has lingering symptoms of drainage and cough. No changes in diet and denies bleeding/bruising. Will hold dose today and reduce tomorrows dose to 10 mg and recheck INR at home on Friday.    INR History:      2024     9:30 AM 2024    12:00 AM 2024     9:43 AM 2024    12:00 AM 2024     2:44 PM 2024    12:00 AM 2024     3:22 PM   Anticoagulation Monitoring   INR 2.90  3.30  4.30  5.00   INR Date 2024   INR Goal 2.5-3.5  2.5-3.5  2.5-3.5  2.5-3.5   Trend Same  Same  Same  Same   Last Week Total 137.5 mg  137.5 mg  137.5 mg  137.5 mg   Next Week Total 137.5 mg  137.5 mg  127.5 mg  107.5 mg   Sun 17.5 mg  17.5 mg  17.5 mg  -   Mon 20 mg  20 mg  20 mg  -   Tue 20 mg  20 mg  10 mg ()  -   Wed 20 mg  20 mg  20  mg  Hold (8/14)   Thu 20 mg  20 mg  20 mg  10 mg (8/15)   Fri 20 mg  20 mg  20 mg  -   Sat 20 mg  20 mg  20 mg  -   Historical INR  3.30      4.30      5.00        Visit Report Report             This result is from an external source.       Plan:  1. INR is Supratherapeutic today- see above in Anticoagulation Summary.   Will instruct Blayne Grijalva to hold warfarin dose today and then take 10 mg tomorrow - see above in Anticoagulation Summary.  2. Follow up on Friday  3.They have been instructed to call if any changes in medications, doses, concerns, etc. Patient expresses understanding and has no further questions at this time.    Tasha Castrejon [facility]

## 2024-08-16 ENCOUNTER — ANTICOAGULATION VISIT (OUTPATIENT)
Dept: PHARMACY | Facility: HOSPITAL | Age: 59
End: 2024-08-16
Payer: COMMERCIAL

## 2024-08-16 DIAGNOSIS — D68.51 FACTOR V LEIDEN MUTATION: Primary | ICD-10-CM

## 2024-08-16 DIAGNOSIS — Z79.01 ANTICOAGULANT LONG-TERM USE: ICD-10-CM

## 2024-08-16 DIAGNOSIS — I82.432 ACUTE DEEP VEIN THROMBOSIS (DVT) OF POPLITEAL VEIN OF LEFT LOWER EXTREMITY: ICD-10-CM

## 2024-08-16 LAB — INR PPP: 2.3

## 2024-08-16 NOTE — PROGRESS NOTES
Anticoagulation Clinic Progress Note      Anticoagulation Summary  As of 2024      INR goal:  2.5-3.5   TTR:  65.8% (1.4 y)   INR used for dosin.30 (2024)   Warfarin maintenance plan:  17.5 mg (5 mg x 3.5) every Sun; 20 mg (5 mg x 4) all other days   Weekly warfarin total:  137.5 mg   No change documented:  Tasha Castrejon RPH   Plan last modified:  Karen Nash RPH (11/3/2023)   Next INR check:  2024   Priority:  Maintenance   Target end date:  Indefinite    Indications    Factor V Leiden mutation [D68.51]  Anticoagulant long-term use [Z79.01]  Acute deep vein thrombosis (DVT) of popliteal vein of left lower extremity [I82.432]                 Anticoagulation Episode Summary       INR check location:      Preferred lab:      Send INR reminders to:   [facility] CBC ANTICOAG POOL    Comments:  [facility]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, Doac failure          Anticoagulation Care Providers       Provider Role Specialty Phone number    Lenny Shin MD Referring Hematology and Oncology [phone]            INR History:      2024     9:43 AM 2024    12:00 AM 2024     2:44 PM 2024    12:00 AM 2024     3:22 PM 2024    12:00 AM 2024     8:11 AM   Anticoagulation Monitoring   INR 3.30  4.30  5.00  2.30   INR Date 2024   INR Goal 2.5-3.5  2.5-3.5  2.5-3.5  2.5-3.5   Trend Same  Same  Same  Same   Last Week Total 137.5 mg  137.5 mg  137.5 mg  107.5 mg   Next Week Total 137.5 mg  127.5 mg  107.5 mg  137.5 mg   Sun 17.5 mg  17.5 mg  -  17.5 mg   Mon 20 mg  20 mg  -  20 mg   Tue 20 mg  10 mg ()  -  20 mg   Wed 20 mg  20 mg  Hold ()  20 mg   Thu 20 mg  20 mg  10 mg (8/15)  20 mg   Fri 20 mg  20 mg  -  20 mg   Sat 20 mg  20 mg  -  20 mg   Historical INR  4.30      5.00      2.30            This result is from an external source.       Plan:  1. INR is Subtherapeutic today- see above in Anticoagulation Summary.   Will instruct Blayne Grijalva to Continue  their warfarin regimen- see above in Anticoagulation Summary.  2. Follow up in 2 weeks  3.They have been instructed to call if any changes in medications, doses, concerns, etc. Unable to reach patient - left voicemail wit dosing instructions.    Tasha Castrejon RPH

## 2024-09-18 LAB — INR PPP: 3.6

## 2024-09-19 ENCOUNTER — ANTICOAGULATION VISIT (OUTPATIENT)
Dept: PHARMACY | Facility: HOSPITAL | Age: 59
End: 2024-09-19
Payer: COMMERCIAL

## 2024-09-19 DIAGNOSIS — Z79.01 ANTICOAGULANT LONG-TERM USE: ICD-10-CM

## 2024-09-19 DIAGNOSIS — D68.51 FACTOR V LEIDEN MUTATION: Primary | ICD-10-CM

## 2024-09-19 DIAGNOSIS — I82.432 ACUTE DEEP VEIN THROMBOSIS (DVT) OF POPLITEAL VEIN OF LEFT LOWER EXTREMITY: ICD-10-CM

## 2024-10-28 ENCOUNTER — ANTICOAGULATION VISIT (OUTPATIENT)
Dept: PHARMACY | Facility: HOSPITAL | Age: 59
End: 2024-10-28
Payer: COMMERCIAL

## 2024-10-28 DIAGNOSIS — Z79.01 ANTICOAGULANT LONG-TERM USE: ICD-10-CM

## 2024-10-28 DIAGNOSIS — I82.432 ACUTE DEEP VEIN THROMBOSIS (DVT) OF POPLITEAL VEIN OF LEFT LOWER EXTREMITY: ICD-10-CM

## 2024-10-28 DIAGNOSIS — D68.51 FACTOR V LEIDEN MUTATION: Primary | ICD-10-CM

## 2024-10-28 LAB — INR PPP: 4.4

## 2024-10-28 NOTE — PROGRESS NOTES
Anticoagulation Clinic Progress Note    Patient's visit was held by phone today.    Anticoagulation Summary  As of 10/28/2024      INR goal:  2.5-3.5   TTR:  61.9% (1.6 y)   INR used for dosin.40 (10/28/2024)   Warfarin maintenance plan:  17.5 mg (5 mg x 3.5) every Sun; 20 mg (5 mg x 4) all other days   Weekly warfarin total:  137.5 mg   Plan last modified:  Karen Nash RPH (11/3/2023)   Next INR check:  2024   Priority:  Maintenance   Target end date:  Indefinite    Indications    Factor V Leiden mutation [D68.51]  Anticoagulant long-term use [Z79.01]  Acute deep vein thrombosis (DVT) of popliteal vein of left lower extremity [I82.432]                 Anticoagulation Episode Summary       INR check location:  --    Preferred lab:  --    Send INR reminders to:   [facility] CBC ANTICOAG POOL    Comments:  [facility]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  Diet: has remained unchanged.    Clinic Interview:  No pertinent clinical findings have been reported. No bleeding issues noted.    INR History:      2024     3:22 PM 2024    12:00 AM 2024     8:11 AM 2024    12:00 AM 2024     1:38 PM 10/28/2024    12:00 AM 10/28/2024     2:15 PM   Anticoagulation Monitoring   INR 5.00  2.30  3.60  4.40   INR Date 2024  2024  2024  10/28/2024   INR Goal 2.5-3.5  2.5-3.5  2.5-3.5  2.5-3.5   Trend Same  Same  Same  Same   Last Week Total 137.5 mg  107.5 mg  137.5 mg  137.5 mg   Next Week Total 107.5 mg  137.5 mg  137.5 mg  127.5 mg   Sun -  17.5 mg  17.5 mg  17.5 mg   Mon -  20 mg  20 mg  10 mg (10/28)   Tue -  20 mg  20 mg  20 mg   Wed Hold ()  20 mg  20 mg  20 mg   Thu 10 mg (8/15)  20 mg  20 mg  20 mg   Fri -  20 mg  20 mg  20 mg   Sat -  20 mg  20 mg  20 mg   Historical INR  2.30      3.60       4.40            This result is from an external source.       Plan:  1. INR is Supratherapeutic today- see above in Anticoagulation Summary.  We could not identify anything that might have caused INR to go up.  Will instruct Blayne Grijalva to reduce today's dose only to 10mg then Continue their warfarin regimen- see above in Anticoagulation Summary.  2. Follow up in 1 week--home test  3.They have been instructed to call if any changes in medications, doses, concerns, etc. Patient expresses understanding and has no further questions at this time. He knows to be alert for any bleeding.    Karen Nash, [facility]

## 2024-11-06 ENCOUNTER — ANTICOAGULATION VISIT (OUTPATIENT)
Dept: PHARMACY | Facility: HOSPITAL | Age: 59
End: 2024-11-06
Payer: COMMERCIAL

## 2024-11-06 DIAGNOSIS — Z79.01 ANTICOAGULANT LONG-TERM USE: ICD-10-CM

## 2024-11-06 DIAGNOSIS — D68.51 FACTOR V LEIDEN MUTATION: Primary | ICD-10-CM

## 2024-11-06 DIAGNOSIS — I82.432 ACUTE DEEP VEIN THROMBOSIS (DVT) OF POPLITEAL VEIN OF LEFT LOWER EXTREMITY: ICD-10-CM

## 2024-11-06 LAB — INR PPP: 3.1

## 2024-11-07 NOTE — PROGRESS NOTES
Anticoagulation Clinic Progress Note    Patient's visit was held by phone today.    Anticoagulation Summary  As of 11/6/2024      INR goal:  2.5-3.5   TTR:  61.4% (1.6 y)   INR used for dosing:  3.10 (11/6/2024)   Warfarin maintenance plan:  17.5 mg (5 mg x 3.5) every Sun; 20 mg (5 mg x 4) all other days   Weekly warfarin total:  137.5 mg   No change documented:  Tasha Castrejon RPH   Plan last modified:  Karen Nash RPH (11/3/2023)   Next INR check:  12/4/2024   Priority:  Maintenance   Target end date:  Indefinite    Indications    Factor V Leiden mutation [D68.51]  Anticoagulant long-term use [Z79.01]  Acute deep vein thrombosis (DVT) of popliteal vein of left lower extremity [I82.432]                 Anticoagulation Episode Summary       INR check location:  --    Preferred lab:  --    Send INR reminders to:  [facility] CBC ANTICOAG POOL    Comments:  [facility]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  Diet: has remained unchanged.    Clinic Interview:  No pertinent clinical findings have been reported.    INR History:      8/16/2024     8:11 AM 9/18/2024    12:00 AM 9/19/2024     1:38 PM 10/28/2024    12:00 AM 10/28/2024     2:15 PM 11/6/2024    12:00 AM 11/6/2024     2:38 PM   Anticoagulation Monitoring   INR 2.30  3.60  4.40  3.10   INR Date 8/16/2024  9/18/2024  10/28/2024  11/6/2024   INR Goal 2.5-3.5  2.5-3.5  2.5-3.5  2.5-3.5   Trend Same  Same  Same  Same   Last Week Total 107.5 mg  137.5 mg  137.5 mg  137.5 mg   Next Week Total 137.5 mg  137.5 mg  127.5 mg  137.5 mg   Sun 17.5 mg  17.5 mg  17.5 mg  17.5 mg   Mon 20 mg  20 mg  10 mg (10/28)  20 mg   Tue 20 mg  20 mg  20 mg  20 mg   Wed 20 mg  20 mg  20 mg  20 mg   Thu 20 mg  20 mg  20 mg  20 mg   Fri 20 mg  20 mg  20 mg  20 mg   Sat 20 mg  20 mg  20 mg  20 mg    Historical INR  3.60      4.40      3.10            This result is from an external source.       Plan:  1. INR is Therapeutic today- see above in Anticoagulation Summary.   Will instruct Blayne Grijalva to Continue their warfarin regimen- see above in Anticoagulation Summary.  2. Follow up in 4 weeks  3.They have been instructed to call if any changes in medications, doses, concerns, etc. Patient expresses understanding and has no further questions at this time.    Tasha Castrejon [facility]

## 2024-12-18 ENCOUNTER — ANTICOAGULATION VISIT (OUTPATIENT)
Dept: PHARMACY | Facility: HOSPITAL | Age: 59
End: 2024-12-18
Payer: COMMERCIAL

## 2024-12-18 DIAGNOSIS — D68.51 FACTOR V LEIDEN MUTATION: Primary | ICD-10-CM

## 2024-12-18 DIAGNOSIS — Z79.01 ANTICOAGULANT LONG-TERM USE: ICD-10-CM

## 2024-12-18 DIAGNOSIS — I82.432 ACUTE DEEP VEIN THROMBOSIS (DVT) OF POPLITEAL VEIN OF LEFT LOWER EXTREMITY: ICD-10-CM

## 2024-12-18 LAB — INR PPP: 3.1

## 2024-12-18 NOTE — PROGRESS NOTES
Anticoagulation Clinic Progress Note    Patient's visit was held by phone today.    Anticoagulation Summary  As of 12/18/2024      INR goal:  2.5-3.5   TTR:  64.0% (1.7 y)   INR used for dosing:  3.10 (12/18/2024)   Warfarin maintenance plan:  17.5 mg (5 mg x 3.5) every Sun; 20 mg (5 mg x 4) all other days   Weekly warfarin total:  137.5 mg   Plan last modified:  Karen Nash RPH (11/3/2023)   Next INR check:  1/15/2025   Priority:  Maintenance   Target end date:  Indefinite    Indications    Factor V Leiden mutation [D68.51]  Anticoagulant long-term use [Z79.01]  Acute deep vein thrombosis (DVT) of popliteal vein of left lower extremity [I82.432]                 Anticoagulation Episode Summary       INR check location:  --    Preferred lab:  --    Send INR reminders to:   [facility] CBC ANTICOAG POOL    Comments:  [facility]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  Diet: has remained unchanged.    Clinic Interview:  No pertinent clinical findings have been reported.    INR History:      9/19/2024     1:38 PM 10/28/2024    12:00 AM 10/28/2024     2:15 PM 11/6/2024    12:00 AM 11/6/2024     2:38 PM 12/18/2024    12:00 AM 12/18/2024     2:30 PM   Anticoagulation Monitoring   INR 3.60  4.40  3.10  3.10   INR Date 9/18/2024  10/28/2024  11/6/2024  12/18/2024   INR Goal 2.5-3.5  2.5-3.5  2.5-3.5  2.5-3.5   Trend Same  Same  Same  Same   Last Week Total 137.5 mg  137.5 mg  137.5 mg  137.5 mg   Next Week Total 137.5 mg  127.5 mg  137.5 mg  137.5 mg   Sun 17.5 mg  17.5 mg  17.5 mg  17.5 mg   Mon 20 mg  10 mg (10/28)  20 mg  20 mg   Tue 20 mg  20 mg  20 mg  20 mg   Wed 20 mg  20 mg  20 mg  20 mg   Thu 20 mg  20 mg  20 mg  20 mg   Fri 20 mg  20 mg  20 mg  20 mg   Sat 20 mg  20 mg  20 mg  20 mg   Historical INR  4.40      3.10      3.10             This result is from an external source.       Plan:  1. INR is Therapeutic today- see above in Anticoagulation Summary.   Will instruct Blayne Grijalva to Continue their warfarin regimen- see above in Anticoagulation Summary.  2. Follow up in 4 weeks  3.They have been instructed to call if any changes in medications, doses, concerns, etc. Patient expresses understanding and has no further questions at this time.    Nel Matute [facility]

## 2025-01-22 ENCOUNTER — ANTICOAGULATION VISIT (OUTPATIENT)
Dept: PHARMACY | Facility: HOSPITAL | Age: 60
End: 2025-01-22
Payer: COMMERCIAL

## 2025-01-22 DIAGNOSIS — I82.432 ACUTE DEEP VEIN THROMBOSIS (DVT) OF POPLITEAL VEIN OF LEFT LOWER EXTREMITY: ICD-10-CM

## 2025-01-22 DIAGNOSIS — D68.51 FACTOR V LEIDEN MUTATION: Primary | ICD-10-CM

## 2025-01-22 DIAGNOSIS — Z79.01 ANTICOAGULANT LONG-TERM USE: ICD-10-CM

## 2025-01-22 LAB — INR PPP: 3.5

## 2025-01-22 NOTE — PROGRESS NOTES
Anticoagulation Clinic Progress Note    Patient's visit was held by phone today.    Anticoagulation Summary  As of 1/22/2025      INR goal:  2.5-3.5   TTR:  65.9% (1.8 y)   INR used for dosing:  3.50 (1/22/2025)   Warfarin maintenance plan:  17.5 mg (5 mg x 3.5) every Sun; 20 mg (5 mg x 4) all other days   Weekly warfarin total:  137.5 mg   No change documented:  Karen Nash RPH   Plan last modified:  Karen Nash RPH (11/3/2023)   Next INR check:  2/24/2025   Priority:  Maintenance   Target end date:  Indefinite    Indications    Factor V Leiden mutation [D68.51]  Anticoagulant long-term use [Z79.01]  Acute deep vein thrombosis (DVT) of popliteal vein of left lower extremity [I82.432]                 Anticoagulation Episode Summary       INR check location:  --    Preferred lab:  --    Send INR reminders to:  [facility] CBC ANTICOAG POOL    Comments:  [facility]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, Doac failure          Anticoagulation Care Providers       Provider Role Specialty Phone number    Lenny Shin MD Referring Hematology and Oncology [phone]            INR History:      10/28/2024     2:15 PM 11/6/2024    12:00 AM 11/6/2024     2:38 PM 12/18/2024    12:00 AM 12/18/2024     2:30 PM 1/22/2025    12:00 AM 1/22/2025     8:34 AM   Anticoagulation Monitoring   INR 4.40  3.10  3.10  3.50   INR Date 10/28/2024  11/6/2024  12/18/2024  1/22/2025   INR Goal 2.5-3.5  2.5-3.5  2.5-3.5  2.5-3.5   Trend Same  Same  Same  Same   Last Week Total 137.5 mg  137.5 mg  137.5 mg  137.5 mg   Next Week Total 127.5 mg  137.5 mg  137.5 mg  137.5 mg   Sun 17.5 mg  17.5 mg  17.5 mg  17.5 mg   Mon 10 mg (10/28)  20 mg  20 mg  20 mg   Tue 20 mg  20 mg  20 mg  20 mg   Wed 20 mg  20 mg  20 mg  20 mg   Thu 20 mg  20 mg  20 mg  20 mg   Fri 20 mg  20 mg  20 mg  20 mg   Sat 20 mg  20 mg  20 mg  20 mg   Historical INR  3.10      3.10      3.50            This result is from an external source.       Plan:  1. INR is Therapeutic today- see above in  Anticoagulation Summary.   Will instruct Blayne Grijalva to Continue their warfarin regimen- see above in Anticoagulation Summary.  2. Follow up in 4 weeks  3.They have been instructed to call if any changes in medications, doses, concerns, etc. Left VM with instructions and to call back with updates or questions. chela Nash RPH

## 2025-03-06 ENCOUNTER — TELEPHONE (OUTPATIENT)
Dept: PHARMACY | Facility: HOSPITAL | Age: 60
End: 2025-03-06
Payer: COMMERCIAL

## 2025-03-06 LAB — INR PPP: 2.9

## 2025-03-06 NOTE — TELEPHONE ENCOUNTER
Attempted to contact patient regarding overdue INR test. Left voicemail with clinic phone number ([phone]).

## 2025-03-07 ENCOUNTER — ANTICOAGULATION VISIT (OUTPATIENT)
Dept: PHARMACY | Facility: HOSPITAL | Age: 60
End: 2025-03-07
Payer: COMMERCIAL

## 2025-03-07 DIAGNOSIS — Z79.01 ANTICOAGULANT LONG-TERM USE: ICD-10-CM

## 2025-03-07 DIAGNOSIS — I82.432 ACUTE DEEP VEIN THROMBOSIS (DVT) OF POPLITEAL VEIN OF LEFT LOWER EXTREMITY: ICD-10-CM

## 2025-03-07 DIAGNOSIS — D68.51 FACTOR V LEIDEN MUTATION: Primary | ICD-10-CM

## 2025-03-07 NOTE — PROGRESS NOTES
Anticoagulation Clinic Progress Note    Anticoagulation Summary  As of 3/7/2025      INR goal:  2.5-3.5   TTR:  67.9% (1.9 y)   INR used for dosin.90 (3/6/2025)   Warfarin maintenance plan:  17.5 mg (5 mg x 3.5) every Sun; 20 mg (5 mg x 4) all other days   Weekly warfarin total:  137.5 mg   No change documented:  Tasha Castrejon RPH   Plan last modified:  Karen Nash RPH (11/3/2023)   Next INR check:  2025   Priority:  Maintenance   Target end date:  Indefinite    Indications    Factor V Leiden mutation [D68.51]  Anticoagulant long-term use [Z79.01]  Acute deep vein thrombosis (DVT) of popliteal vein of left lower extremity [I82.432]                 Anticoagulation Episode Summary       INR check location:  --    Preferred lab:  --    Send INR reminders to:   [facility] CBC ANTICOAG POOL    Comments:  [facility]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5 mg   Sun 17.5 mg  17.5 mg  17.5 mg  17.5 mg   Mon 20 mg  20 mg  20 mg  20 mg   Tue 20 mg  20 mg  20 mg  20 mg   Wed 20 mg  20 mg  20 mg  20 mg   Thu 20 mg  20 mg  20 mg  20 mg   Fri 20 mg  20 mg  20 mg  20 mg   Sat 20 mg  20 mg  20 mg  20 mg   Historical INR  3.10      3.50      2.90            This result is from an external source.       Plan:  1. INR is Therapeutic today- see above in Anticoagulation Summary.   Will instruct Blayne Grijalva to  Continue their warfarin regimen- see above in Anticoagulation Summary.  2. Follow up in 4 weeks  3.They have been instructed to call if any changes in medications, doses, concerns, etc.     Tasha Castrejon RPH

## 2025-04-09 ENCOUNTER — ANTICOAGULATION VISIT (OUTPATIENT)
Dept: PHARMACY | Facility: HOSPITAL | Age: 60
End: 2025-04-09
Payer: COMMERCIAL

## 2025-04-09 DIAGNOSIS — D68.51 FACTOR V LEIDEN MUTATION: Primary | ICD-10-CM

## 2025-04-09 DIAGNOSIS — I82.432 ACUTE DEEP VEIN THROMBOSIS (DVT) OF POPLITEAL VEIN OF LEFT LOWER EXTREMITY: ICD-10-CM

## 2025-04-09 DIAGNOSIS — Z79.01 ANTICOAGULANT LONG-TERM USE: ICD-10-CM

## 2025-04-09 LAB — INR PPP: 3.5

## 2025-04-09 NOTE — PROGRESS NOTES
Anticoagulation Clinic Progress Note    Anticoagulation Summary  As of 4/9/2025      INR goal:  2.5-3.5   TTR:  69.4% (2 y)   INR used for dosing:  3.50 (4/9/2025)   Warfarin maintenance plan:  17.5 mg (5 mg x 3.5) every Sun; 20 mg (5 mg x 4) all other days   Weekly warfarin total:  137.5 mg   No change documented:  Tasha Castrejon RPH   Plan last modified:  Karen Nash RPH (11/3/2023)   Next INR check:  5/6/2025   Priority:  Maintenance   Target end date:  Indefinite    Indications    Factor V Leiden mutation [D68.51]  Anticoagulant long-term use [Z79.01]  Acute deep vein thrombosis (DVT) of popliteal vein of left lower extremity [I82.432]                 Anticoagulation Episode Summary       INR check location:  --    Preferred lab:  --    Send INR reminders to:   [facility] CBC ANTICOAG POOL    Comments:  [facility]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, Doac failure          Anticoagulation Care Providers       Provider Role Specialty Phone number    Lenny Shin MD Referring Hematology and Oncology [phone]              INR History:      12/18/2024     2:30 PM 1/22/2025    12:00 AM 1/22/2025     8:34 AM 3/6/2025    12:00 AM 3/7/2025    12:49 PM 4/9/2025    12:00 AM 4/9/2025    10:44 AM   Anticoagulation Monitoring   INR 3.10  3.50  2.90  3.50   INR Date 12/18/2024  1/22/2025  3/6/2025  4/9/2025   INR Goal 2.5-3.5  2.5-3.5  2.5-3.5  2.5-3.5   Trend Same  Same  Same  Same   Last Week Total 137.5 mg  137.5 mg  137.5 mg  137.5 mg   Next Week Total 137.5 mg  137.5 mg  137.5 mg  137.5 mg   Sun 17.5 mg  17.5 mg  17.5 mg  17.5 mg   Mon 20 mg  20 mg  20 mg  20 mg   Tue 20 mg  20 mg  20 mg  20 mg   Wed 20 mg  20 mg  20 mg  20 mg   Thu 20 mg  20 mg  20 mg  20 mg   Fri 20 mg  20 mg  20 mg  20 mg   Sat 20 mg  20 mg  20 mg  20 mg   Historical INR  3.50      2.90      3.50            This result is from an external source.       Plan:  1. INR is Therapeutic today- see above in Anticoagulation Summary.   Will instruct Blayne Grijalva to Continue  their warfarin regimen- see above in Anticoagulation Summary.  2. Follow up in 4 weeks  3.They have been instructed to call if any changes in medications, doses, concerns, etc. Patient expresses understanding and has no further questions at this time.    Tasha Castrejon [facility]
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
## 2025-05-06 ENCOUNTER — LAB (OUTPATIENT)
Dept: LAB | Facility: HOSPITAL | Age: 60
End: 2025-05-06
Payer: COMMERCIAL

## 2025-05-06 ENCOUNTER — OFFICE VISIT (OUTPATIENT)
Dept: ONCOLOGY | Facility: CLINIC | Age: 60
End: 2025-05-06
Payer: COMMERCIAL

## 2025-05-06 ENCOUNTER — ANTICOAGULATION VISIT (OUTPATIENT)
Dept: ONCOLOGY | Facility: HOSPITAL | Age: 60
End: 2025-05-06
Payer: COMMERCIAL

## 2025-05-06 VITALS
WEIGHT: 196 LBS | HEART RATE: 66 BPM | OXYGEN SATURATION: 98 % | DIASTOLIC BLOOD PRESSURE: 77 MMHG | TEMPERATURE: 98.4 F | BODY MASS INDEX: 24.5 KG/M2 | SYSTOLIC BLOOD PRESSURE: 114 MMHG

## 2025-05-06 DIAGNOSIS — Z79.01 ANTICOAGULANT LONG-TERM USE: ICD-10-CM

## 2025-05-06 DIAGNOSIS — D68.51 FACTOR V LEIDEN MUTATION: Primary | ICD-10-CM

## 2025-05-06 DIAGNOSIS — I82.432 ACUTE DEEP VEIN THROMBOSIS (DVT) OF POPLITEAL VEIN OF LEFT LOWER EXTREMITY: ICD-10-CM

## 2025-05-06 DIAGNOSIS — D68.51 FACTOR V LEIDEN MUTATION: ICD-10-CM

## 2025-05-06 LAB
BASOPHILS # BLD AUTO: 0.04 10*3/MM3 (ref 0–0.2)
BASOPHILS NFR BLD AUTO: 0.7 % (ref 0–1.5)
DEPRECATED RDW RBC AUTO: 45.1 FL (ref 37–54)
EOSINOPHIL # BLD AUTO: 0.14 10*3/MM3 (ref 0–0.4)
EOSINOPHIL NFR BLD AUTO: 2.3 % (ref 0.3–6.2)
ERYTHROCYTE [DISTWIDTH] IN BLOOD BY AUTOMATED COUNT: 12.8 % (ref 12.3–15.4)
HCT VFR BLD AUTO: 43.5 % (ref 37.5–51)
HGB BLD-MCNC: 14.3 G/DL (ref 13–17.7)
IMM GRANULOCYTES # BLD AUTO: 0.02 10*3/MM3 (ref 0–0.05)
IMM GRANULOCYTES NFR BLD AUTO: 0.3 % (ref 0–0.5)
INR PPP: 3.19 (ref 0.9–1.1)
LYMPHOCYTES # BLD AUTO: 1.98 10*3/MM3 (ref 0.7–3.1)
LYMPHOCYTES NFR BLD AUTO: 32.9 % (ref 19.6–45.3)
MCH RBC QN AUTO: 31.2 PG (ref 26.6–33)
MCHC RBC AUTO-ENTMCNC: 32.9 G/DL (ref 31.5–35.7)
MCV RBC AUTO: 95 FL (ref 79–97)
MONOCYTES # BLD AUTO: 0.58 10*3/MM3 (ref 0.1–0.9)
MONOCYTES NFR BLD AUTO: 9.6 % (ref 5–12)
NEUTROPHILS NFR BLD AUTO: 3.26 10*3/MM3 (ref 1.7–7)
NEUTROPHILS NFR BLD AUTO: 54.2 % (ref 42.7–76)
NRBC BLD AUTO-RTO: 0.3 /100 WBC (ref 0–0.2)
PLATELET # BLD AUTO: 202 10*3/MM3 (ref 140–450)
PMV BLD AUTO: 9.9 FL (ref 6–12)
PROTHROMBIN TIME: 33.1 SECONDS (ref 11.7–14.2)
RBC # BLD AUTO: 4.58 10*6/MM3 (ref 4.14–5.8)
WBC NRBC COR # BLD AUTO: 6.02 10*3/MM3 (ref 3.4–10.8)

## 2025-05-06 PROCEDURE — 85610 PROTHROMBIN TIME: CPT | Performed by: INTERNAL MEDICINE

## 2025-05-06 PROCEDURE — 85025 COMPLETE CBC W/AUTO DIFF WBC: CPT

## 2025-05-06 PROCEDURE — G0463 HOSPITAL OUTPT CLINIC VISIT: HCPCS

## 2025-05-06 PROCEDURE — 36415 COLL VENOUS BLD VENIPUNCTURE: CPT

## 2025-05-06 NOTE — PROGRESS NOTES
Anticoagulation Clinic Progress Note    Patient's visit was held in office today.    Anticoagulation Summary  As of 5/6/2025      INR goal:  2.5-3.5   TTR:  70.5% (2.1 y)   INR used for dosing:  3.19 (5/6/2025)   Warfarin maintenance plan:  17.5 mg (5 mg x 3.5) every Sun; 20 mg (5 mg x 4) all other days   Weekly warfarin total:  137.5 mg   No change documented:  Tasha Castrejon RPH   Plan last modified:  Karen Nash RPH (11/3/2023)   Next INR check:  6/3/2025   Priority:  Maintenance   Target end date:  Indefinite    Indications    Factor V Leiden mutation [D68.51]  Anticoagulant long-term use [Z79.01]  Acute deep vein thrombosis (DVT) of popliteal vein of left lower extremity [I82.432]                 Anticoagulation Episode Summary       INR check location:  --    Preferred lab:  --    Send INR reminders to:  [facility] CBC ANTICOAG POOL    Comments:  [employer]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, Doac failure          Anticoagulation Care Providers       Provider Role Specialty Phone number    Lenny Shin MD Referring Hematology and Oncology [phone]            Clinic Interview:  Patient Findings     Negatives:  Signs/symptoms of thrombosis, Signs/symptoms of bleeding,   Laboratory test error suspected, Change in health, Change in alcohol use,   Change in activity, Upcoming invasive procedure, Emergency department   visit, Upcoming dental procedure, Missed doses, Extra doses, Change in   medications, Change in diet/appetite, Hospital admission, Bruising, Other   complaints      Clinical Outcomes     Negatives:  Major bleeding event, Thromboembolic event,   Anticoagulation-related hospital admission, Anticoagulation-related ED   visit, Anticoagulation-related fatality        INR History:      Latest Ref Rng & Units 1/22/2025     8:34 AM 3/6/2025    12:00 AM 3/7/2025    12:49 PM 4/9/2025    12:00 AM 4/9/2025    10:44 AM 5/6/2025    10:27 AM 5/6/2025    10:30 AM   Anticoagulation Monitoring   INR  3.50  2.90  3.50  3.19   INR Date   1/22/2025  3/6/2025  4/9/2025  5/6/2025   INR Goal  2.5-3.5  2.5-3.5  2.5-3.5  2.5-3.5   Trend  Same  Same  Same  Same   Last Week Total  137.5 mg  137.5 mg  137.5 mg  137.5 mg   Next Week Total  137.5 mg  137.5 mg  137.5 mg  137.5 mg   Sun  17.5 mg  17.5 mg  17.5 mg  17.5 mg   Mon  20 mg  20 mg  20 mg  20 mg   Tue  20 mg  20 mg  20 mg  20 mg   Wed  20 mg  20 mg  20 mg  20 mg   Thu  20 mg  20 mg  20 mg  20 mg   Fri  20 mg  20 mg  20 mg  20 mg   Sat  20 mg  20 mg  20 mg  20 mg   Historical INR 0.90 - 1.10  2.90      3.50      3.19     Visit Report       Report Report       This result is from an external source.       Plan:  1. INR is Therapeutic today- see above in Anticoagulation Summary.  Will instruct Blayne Grijalva to Continue their warfarin regimen- see above in Anticoagulation Summary.  2. Follow up in 1 month  3. Patient declines warfarin refills.  4. Verbal and written information provided. Patient expresses understanding and has no further questions at this time.    Tasha Castrejon [facility]

## 2025-06-06 LAB — INR PPP: 4

## 2025-06-09 ENCOUNTER — ANTICOAGULATION VISIT (OUTPATIENT)
Dept: PHARMACY | Facility: HOSPITAL | Age: 60
End: 2025-06-09
Payer: COMMERCIAL

## 2025-06-09 DIAGNOSIS — D68.51 FACTOR V LEIDEN MUTATION: Primary | ICD-10-CM

## 2025-06-09 DIAGNOSIS — Z79.01 ANTICOAGULANT LONG-TERM USE: ICD-10-CM

## 2025-06-09 DIAGNOSIS — I82.432 ACUTE DEEP VEIN THROMBOSIS (DVT) OF POPLITEAL VEIN OF LEFT LOWER EXTREMITY: ICD-10-CM

## 2025-06-11 NOTE — PROGRESS NOTES
Anticoagulation Clinic Progress Note      Anticoagulation Summary  As of 2025      INR goal:  2.5-3.5   TTR:  69.3% (2.2 y)   INR used for dosin.00 (2025)   Warfarin maintenance plan:  17.5 mg (5 mg x 3.5) every Sun; 20 mg (5 mg x 4) all other days   Weekly warfarin total:  137.5 mg   No change documented:  Karen Nash RPH   Plan last modified:  Karen Nash RPH (11/3/2023)   Next INR check:  --   Priority:  Maintenance   Target end date:  Indefinite    Indications    Factor V Leiden mutation [D68.51]  Anticoagulant long-term use [Z79.01]  Acute deep vein thrombosis (DVT) of popliteal vein of left lower extremity [I82.432]                 Anticoagulation Episode Summary       INR check location:  --    Preferred lab:  --    Send INR reminders to:   [facility] CBC ANTICOAG POOL    Comments:  [employer]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, Doac failure          Anticoagulation Care Providers       Provider Role Specialty Phone number    Lenny Shin MD Referring Hematology and Oncology [phone]          Received home test INR and left VM for patient to call to discuss results.    INR History:      3/7/2025    12:49 PM 2025    12:00 AM 2025    10:44 AM 2025    10:27 AM 2025    10:30 AM 2025    12:00 AM 2025    11:13 AM   Anticoagulation Monitoring   INR 2.90  3.50  3.19  4.00   INR Date 3/6/2025  2025  2025  2025   INR Goal 2.5-3.5  2.5-3.5  2.5-3.5  2.5-3.5   Trend Same  Same  Same  Same   Last Week Total 137.5 mg  137.5 mg  137.5 mg  137.5 mg   Next Week Total 137.5 mg  137.5 mg  137.5 mg  137.5 mg   Sun 17.5 mg  17.5 mg  17.5 mg  17.5 mg   Mon 20 mg  20 mg  20 mg  20 mg   Tue 20 mg  20 mg  20 mg  20 mg   Wed 20 mg  20 mg  20 mg  20 mg   Thu 20 mg  20 mg  20 mg  20 mg   Fri 20 mg  20 mg  20 mg  20 mg   Sat 20 mg  20 mg  20 mg  20 mg   Historical INR  3.50      3.19   4.00        Visit Report    Report Report         This result is from an external source.       Plan:  1. INR is  Supratherapeutic today- see above in Anticoagulation Summary.   2.They have been instructed to call if any changes in medications, doses, concerns, etc. Left VM to call back with updates or questions and to discuss warfarin dosing/INR result.     Karen Nash RPH

## 2025-07-17 LAB — INR PPP: 5.2

## 2025-07-18 ENCOUNTER — ANTICOAGULATION VISIT (OUTPATIENT)
Dept: PHARMACY | Facility: HOSPITAL | Age: 60
End: 2025-07-18
Payer: COMMERCIAL

## 2025-07-18 DIAGNOSIS — Z79.01 ANTICOAGULANT LONG-TERM USE: ICD-10-CM

## 2025-07-18 DIAGNOSIS — D68.51 FACTOR V LEIDEN MUTATION: Primary | ICD-10-CM

## 2025-07-18 DIAGNOSIS — I82.432 ACUTE DEEP VEIN THROMBOSIS (DVT) OF POPLITEAL VEIN OF LEFT LOWER EXTREMITY: ICD-10-CM

## 2025-07-18 LAB — INR PPP: 4.7

## 2025-07-18 NOTE — PROGRESS NOTES
Anticoagulation Clinic Progress Note    Patient's visit was held via telephone today.    Anticoagulation Summary  As of 2025      INR goal:  2.5-3.5   TTR:  65.8% (2.3 y)   INR used for dosin.70 (2025)   Warfarin maintenance plan:  17.5 mg (5 mg x 3.5) every Sun; 20 mg (5 mg x 4) all other days   Weekly warfarin total:  137.5 mg   Plan last modified:  Karen Nash RPH (11/3/2023)   Next INR check:  2025   Priority:  Maintenance   Target end date:  Indefinite    Indications    Factor V Leiden mutation [D68.51]  Anticoagulant long-term use [Z79.01]  Acute deep vein thrombosis (DVT) of popliteal vein of left lower extremity [I82.432]                 Anticoagulation Episode Summary       INR check location:  --    Preferred lab:  --    Send INR reminders to:   [facility] CBC ANTICOAG POOL    Comments:  [employer]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, Doac failure          Anticoagulation Care Providers       Provider Role Specialty Phone number    Lenny Shin MD Referring Hematology and Oncology [phone]            Clinic Interview:     INR 4.7 pt held on the night before, INR only down 0.5, hold one more day (in lieu of recent head injury), resume dose Saturday and  and recheck on Monday. Will take a few days off cycling/exercise in heat which is only explanation for elevated INR; pt not having any oozing/bleeding from head injury   INR 5.2 this test was from ER - trimming trees and limb fell on head which required 16 staples and 2 stitches - held dose on  d/t 5.2 INR - retesting today for weekend dosing  plan; no change in diet or workout plan - pt no explanation why elevated; working in the heat is only explanation for elevated INR    INR History:      2025    10:27 AM 2025    10:30 AM 2025    12:00 AM 2025    11:13 AM 2025    12:00 AM 2025    12:00 AM 2025     8:54 AM   Anticoagulation Monitoring   INR  3.19  4.00   4.70   INR Date  20252025    INR Goal  2.5-3.5  2.5-3.5   2.5-3.5   Trend  Same  Same   Same   Last Week Total  137.5 mg  137.5 mg   117.5 mg   Next Week Total  137.5 mg  137.5 mg   117.5 mg   Sun  17.5 mg  17.5 mg   17.5 mg   Mon  20 mg  20 mg   -   Tue  20 mg  20 mg   -   Wed  20 mg  20 mg   -   Thu  20 mg  20 mg   -   Fri  20 mg  20 mg   Hold (7/18)   Sat  20 mg  20 mg   20 mg   Historical INR 3.19   4.00      5.20     4.70        Visit Report Report Report            This result is from an external source.       Plan:  1. INR is Supratherapeutic today- see above in Anticoagulation Summary.  Will instruct Blayne Grijalva to HOLD warfarin again tonight, take regular dose on Sat and Sun and recheck on Monday - see above in Anticoagulation Summary.  2. Follow up in 3 days  3. Verbal information provided. Patient expresses understanding and has no further questions at this time.    Nel Matute [facility]

## 2025-07-21 LAB — INR PPP: 1.7

## 2025-07-22 ENCOUNTER — ANTICOAGULATION VISIT (OUTPATIENT)
Dept: PHARMACY | Facility: HOSPITAL | Age: 60
End: 2025-07-22
Payer: COMMERCIAL

## 2025-07-22 DIAGNOSIS — D68.51 FACTOR V LEIDEN MUTATION: Primary | ICD-10-CM

## 2025-07-22 DIAGNOSIS — I82.432 ACUTE DEEP VEIN THROMBOSIS (DVT) OF POPLITEAL VEIN OF LEFT LOWER EXTREMITY: ICD-10-CM

## 2025-07-22 DIAGNOSIS — Z79.01 ANTICOAGULANT LONG-TERM USE: ICD-10-CM

## 2025-07-22 NOTE — PROGRESS NOTES
Anticoagulation Clinic Progress Note    Patient's visit was held by phone today.    Anticoagulation Summary  As of 2025      INR goal:  2.5-3.5   TTR:  65.7% (2.3 y)   INR used for dosin.70 (2025)   Warfarin maintenance plan:  17.5 mg (5 mg x 3.5) every Sun; 20 mg (5 mg x 4) all other days   Weekly warfarin total:  137.5 mg   No change documented:  Karen Nash RPH   Plan last modified:  Karen Nash RPH (11/3/2023)   Next INR check:  2025   Priority:  Maintenance   Target end date:  Indefinite    Indications    Factor V Leiden mutation [D68.51]  Anticoagulant long-term use [Z79.01]  Acute deep vein thrombosis (DVT) of popliteal vein of left lower extremity [I82.432]                 Anticoagulation Episode Summary       INR check location:  --    Preferred lab:  --    Send INR reminders to:  [facility] CBC ANTICOAG POOL    Comments:  [employer]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, Doac failure          Anticoagulation Care Providers       Provider Role Specialty Phone number    Lenny Shin MD Referring Hematology and Oncology [phone]            Clinic Interview:  We did have patient hold warfarin doses on Thurs and Fri last week based on supratherapeutic INRs.      INR History:      2025    12:00 AM 2025    11:13 AM 2025    12:00 AM 2025    12:00 AM 2025     8:54 AM 2025    12:00 AM 2025    10:24 AM   Anticoagulation Monitoring   INR  4.00   4.70  1.70   INR Date  2025   INR Goal  2.5-3.5   2.5-3.5  2.5-3.5   Trend  Same   Same  Same   Last Week Total  137.5 mg   117.5 mg  97.5 mg   Next Week Total  137.5 mg   117.5 mg  137.5 mg   Sun  17.5 mg   17.5 mg  -   Mon  20 mg   -  -   Tue  20 mg   -  20 mg   Wed  20 mg   -  20 mg   Thu  20 mg   -  -   Fri  20 mg   Hold ()  -   Sat  20 mg   20 mg  -   Historical INR 4.00      5.20     4.70      1.70            This result is from an external source.       Plan:  1. INR is Subtherapeutic today- see above in  Anticoagulation Summary.   Will instruct Blayne Grijalva to Continue their warfarin regimen- see above in Anticoagulation Summary.  2. Follow up in 3 days and will plan to boost warfarin dosing then if INR not at least 2.  3.They have been instructed to call if any changes in medications, doses, concerns, etc. Left VM with instructions and to call back with updates or questions. .    Karen Nash [facility]

## 2025-07-28 LAB — INR PPP: 3.6

## 2025-07-29 ENCOUNTER — ANTICOAGULATION VISIT (OUTPATIENT)
Dept: PHARMACY | Facility: HOSPITAL | Age: 60
End: 2025-07-29
Payer: COMMERCIAL

## 2025-07-29 DIAGNOSIS — I82.432 ACUTE DEEP VEIN THROMBOSIS (DVT) OF POPLITEAL VEIN OF LEFT LOWER EXTREMITY: ICD-10-CM

## 2025-07-29 DIAGNOSIS — Z79.01 ANTICOAGULANT LONG-TERM USE: ICD-10-CM

## 2025-07-29 DIAGNOSIS — D68.51 FACTOR V LEIDEN MUTATION: Primary | ICD-10-CM

## 2025-07-29 NOTE — PROGRESS NOTES
Anticoagulation Clinic Progress Note      Anticoagulation Summary  As of 7/29/2025      INR goal:  2.5-3.5   TTR:  65.6% (2.3 y)   INR used for dosing:  3.60 (7/28/2025)   Warfarin maintenance plan:  17.5 mg (5 mg x 3.5) every Sun; 20 mg (5 mg x 4) all other days   Weekly warfarin total:  137.5 mg   Plan last modified:  Karen Nash RPH (11/3/2023)   Next INR check:  8/12/2025   Priority:  Maintenance   Target end date:  Indefinite    Indications    Factor V Leiden mutation [D68.51]  Anticoagulant long-term use [Z79.01]  Acute deep vein thrombosis (DVT) of popliteal vein of left lower extremity [I82.432]                 Anticoagulation Episode Summary       INR check location:  --    Preferred lab:  --    Send INR reminders to:   [facility] CBC ANTICOAG POOL    Comments:  [employer]cel[employer]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, Doac failure          Anticoagulation Care Providers       Provider Role Specialty Phone number    Lenny Shin MD Referring Hematology and Oncology [phone]            INR History:      7/17/2025    12:00 AM 7/18/2025    12:00 AM 7/18/2025     8:54 AM 7/21/2025    12:00 AM 7/22/2025    10:24 AM 7/28/2025    12:00 AM 7/29/2025    11:58 AM   Anticoagulation Monitoring   INR   4.70  1.70  3.60   INR Date   7/18/2025 7/21/2025 7/28/2025   INR Goal   2.5-3.5  2.5-3.5  2.5-3.5   Trend   Same  Same  Same   Last Week Total   117.5 mg  97.5 mg  137.5 mg   Next Week Total   117.5 mg  137.5 mg  135 mg   Sun   17.5 mg  -  17.5 mg   Mon   -  -  20 mg   Tue   -  20 mg  17.5 mg (7/29); Otherwise 20 mg   Wed   -  20 mg  20 mg   Thu   -  -  20 mg   Fri   Hold (7/18)  -  20 mg   Sat   20 mg  -  20 mg   Historical INR 5.20     4.70      1.70      3.60            This result is from an external source.       Plan:  1. INR is Supratherapeutic today- see above in Anticoagulation Summary.   Will instruct Blayne Grijalva to take 17.5 mg today only, then Continue their warfarin regimen- see above in Anticoagulation Summary.  2. Follow up in  2 weeks  3.They have been instructed to call if any changes in medications, doses, concerns, etc. Patient expresses understanding and has no further questions at this time.    Tasha Castrejon [facility]

## 2025-08-19 ENCOUNTER — ANTICOAGULATION VISIT (OUTPATIENT)
Dept: PHARMACY | Facility: HOSPITAL | Age: 60
End: 2025-08-19
Payer: COMMERCIAL

## 2025-08-19 DIAGNOSIS — D68.51 FACTOR V LEIDEN MUTATION: Primary | ICD-10-CM

## 2025-08-19 DIAGNOSIS — Z79.01 ANTICOAGULANT LONG-TERM USE: ICD-10-CM

## 2025-08-19 DIAGNOSIS — I82.432 ACUTE DEEP VEIN THROMBOSIS (DVT) OF POPLITEAL VEIN OF LEFT LOWER EXTREMITY: ICD-10-CM

## 2025-08-19 LAB — INR PPP: 4.5
